# Patient Record
Sex: MALE | Race: WHITE | URBAN - METROPOLITAN AREA
[De-identification: names, ages, dates, MRNs, and addresses within clinical notes are randomized per-mention and may not be internally consistent; named-entity substitution may affect disease eponyms.]

---

## 2020-04-15 ENCOUNTER — DOCUMENTATION ONLY (OUTPATIENT)
Dept: ADDICTION MEDICINE | Facility: CLINIC | Age: 25
End: 2020-04-15

## 2020-04-15 NOTE — PROGRESS NOTES
Patient is scheduled for a new Recovery Clinic appt today with Andreina Krishnamurthy.    Patient does not appear to have health insurance.     [employer] Counselor contact number: [phone]    [email or web address] to apply for insurance online.    Community Suboxone resources for patient's without insurance:    [facility] ([facility])  [address] [place] [address]  [phone] main desk  [phone] Suboxone appt scheduling  Offers sliding fee scale and 340B pharmacy options for patients without insurance    [facility]  [facility] [place] [facility]  [address] [place] [address]  [facility]  Wednesdays 3-5pm    [facility] ([facility])  [address] [place] [address]  [phone]  Offers sliding fee scale for patients without insurance    Cass Adam RN on 4/15/2020 at 9:34 AM

## 2020-04-24 ENCOUNTER — TELEPHONE (OUTPATIENT)
Dept: ADDICTION MEDICINE | Facility: CLINIC | Age: 25
End: 2020-04-24

## 2020-04-28 ENCOUNTER — HOSPITAL ENCOUNTER (EMERGENCY)
Facility: CLINIC | Age: 25
Discharge: HOME | End: 2020-04-28
Attending: EMERGENCY MEDICINE | Admitting: EMERGENCY MEDICINE
Payer: MEDICAID

## 2020-04-28 ENCOUNTER — TELEPHONE (OUTPATIENT)
Dept: ADDICTION MEDICINE | Facility: CLINIC | Age: 25
End: 2020-04-28

## 2020-04-28 VITALS
OXYGEN SATURATION: 98 % | RESPIRATION RATE: 18 BRPM | TEMPERATURE: 97.7 F | HEART RATE: 69 BPM | SYSTOLIC BLOOD PRESSURE: 139 MMHG | DIASTOLIC BLOOD PRESSURE: 87 MMHG

## 2020-04-28 DIAGNOSIS — F11.10 OPIOID ABUSE (H): ICD-10-CM

## 2020-04-28 LAB
AMPHETAMINES UR QL SCN: NEGATIVE
BARBITURATES UR QL: NEGATIVE
BENZODIAZ UR QL: NEGATIVE
CANNABINOIDS UR QL SCN: POSITIVE
COCAINE UR QL: NEGATIVE
ETHANOL UR QL SCN: NEGATIVE
OPIATES UR QL SCN: NEGATIVE

## 2020-04-28 PROCEDURE — 25000132 ZZH RX MED GY IP 250 OP 250 PS 637: Performed by: EMERGENCY MEDICINE

## 2020-04-28 PROCEDURE — 99283 EMERGENCY DEPT VISIT LOW MDM: CPT | Mod: Z6 | Performed by: EMERGENCY MEDICINE

## 2020-04-28 PROCEDURE — 80307 DRUG TEST PRSMV CHEM ANLYZR: CPT | Performed by: EMERGENCY MEDICINE

## 2020-04-28 PROCEDURE — 99283 EMERGENCY DEPT VISIT LOW MDM: CPT | Performed by: EMERGENCY MEDICINE

## 2020-04-28 PROCEDURE — 80320 DRUG SCREEN QUANTALCOHOLS: CPT | Performed by: EMERGENCY MEDICINE

## 2020-04-28 RX ORDER — BUPRENORPHINE AND NALOXONE 4; 1 MG/1; MG/1
1 FILM, SOLUBLE BUCCAL; SUBLINGUAL ONCE
Status: COMPLETED | OUTPATIENT
Start: 2020-04-28 | End: 2020-04-28

## 2020-04-28 RX ADMIN — BUPRENORPHINE AND NALOXONE 1 FILM: 4; 1 FILM BUCCAL; SUBLINGUAL at 16:57

## 2020-04-28 ASSESSMENT — ENCOUNTER SYMPTOMS
FEVER: 0
COUGH: 0
ABDOMINAL PAIN: 0
SHORTNESS OF BREATH: 0

## 2020-04-28 NOTE — DISCHARGE INSTRUCTIONS
Follow-up with your recovery clinic as scheduled.  Return to the ER for new or worsening symptoms.

## 2020-04-28 NOTE — TELEPHONE ENCOUNTER
Have exchanged a number of calls with Dami today. He and his girlfriend are both in withdrawal and have not used for two days. They are both still interested in getting on suboxone. I directed Dami to apply for insurance today and for them both to come to the ED today. Once they are here we will get them appts in the recovery clinic.

## 2020-04-28 NOTE — ED AVS SNAPSHOT
[facility], [place], Emergency Department  [address]  [facility] [address]  Phone:  [phone]  Fax:  [phone]                                    Dami Weber   MRN: [id number]    Department:  [facility], Emergency Department   Date of Visit:  4/28/2020           After Visit Summary Signature Page    I have received my discharge instructions, and my questions have been answered. I have discussed any challenges I see with this plan with the nurse or doctor.    ..........................................................................................................................................  Patient/Patient Representative Signature      ..........................................................................................................................................  Patient Representative Print Name and Relationship to Patient    ..................................................               ................................................  Date                                   Time    ..........................................................................................................................................  Reviewed by Signature/Title    ...................................................              ..............................................  Date                                               Time          22EPIC Rev 08/18

## 2020-04-29 ENCOUNTER — DOCUMENTATION ONLY (OUTPATIENT)
Dept: ADDICTION MEDICINE | Facility: CLINIC | Age: 25
End: 2020-04-29

## 2020-04-29 ENCOUNTER — TELEPHONE (OUTPATIENT)
Dept: ADDICTION MEDICINE | Facility: CLINIC | Age: 25
End: 2020-04-29

## 2020-04-29 ENCOUNTER — VIRTUAL VISIT (OUTPATIENT)
Dept: ADDICTION MEDICINE | Facility: CLINIC | Age: 25
End: 2020-04-29
Payer: MEDICAID

## 2020-04-29 DIAGNOSIS — F17.200 TOBACCO USE DISORDER: ICD-10-CM

## 2020-04-29 DIAGNOSIS — F15.21 SEVERE METHAMPHETAMINE USE DISORDER IN SUSTAINED REMISSION (H): ICD-10-CM

## 2020-04-29 DIAGNOSIS — F43.10 PTSD (POST-TRAUMATIC STRESS DISORDER): ICD-10-CM

## 2020-04-29 DIAGNOSIS — F12.20 CANNABIS USE DISORDER, MODERATE, DEPENDENCE (H): ICD-10-CM

## 2020-04-29 DIAGNOSIS — F11.20 OPIOID USE DISORDER, SEVERE, DEPENDENCE (H): Primary | ICD-10-CM

## 2020-04-29 PROBLEM — F15.90 STIMULANT USE DISORDER: Status: ACTIVE | Noted: 2020-04-29

## 2020-04-29 PROCEDURE — 99443 ZZC PHYSICIAN TELEPHONE EVALUATION 21-30 MIN: CPT

## 2020-04-29 RX ORDER — BUPRENORPHINE AND NALOXONE 8; 2 MG/1; MG/1
FILM, SOLUBLE BUCCAL; SUBLINGUAL
Qty: 11 FILM | Refills: 0 | Status: SHIPPED | OUTPATIENT
Start: 2020-04-29 | End: 2020-05-05

## 2020-04-29 NOTE — TELEPHONE ENCOUNTER
Talked with Dami about treatment options. He wants to get into LP with his girlfriend which I informed him would not be a possibility.     He was able to get his insurance active and made his Recovery Clinic Appointment today.    I forwarded his and his girlfriends chart to Jeanne Rodriguez so that she could reach out to them and do rule 25's. These are scheduled for Friday.

## 2020-04-29 NOTE — PROGRESS NOTES
4/28/2020- I have been reaching out to Dami regularly and was able to make contact with him and his girlfriend yesterday at which time he informed they were both in withdrawal and not doing to well. He stated that it had been 2 days since they both had last used.    I instructed him to come to the ED and get seen here so that we would be able to set them up with an appointment at the Recovery Clinic.     They both came yet left half way through the visit because his girlfriend was having anxiety and suffers from PTSD. I was able to talk to them while they were outside and encourage them to come back in and be seen. They both did return and were dosed with Suboxone and were setup with appointment for 4/29/20 in the recovery clinic.
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
## 2020-04-30 ENCOUNTER — TELEPHONE (OUTPATIENT)
Dept: ADDICTION MEDICINE | Facility: CLINIC | Age: 25
End: 2020-04-30

## 2020-04-30 NOTE — TELEPHONE ENCOUNTER
Prior Authorization Retail Medication Request    Medication/Dose: buprenorphine HCl-naloxone HCl (SUBOXONE) 8-2 MG per film  ICD code (if different than what is on RX):    Previously Tried and Failed:    Rationale:      Insurance Name:  Covermeds  Insurance ID:  [id number]      Pharmacy Information (if different than what is on RX)  Name:    Phone:

## 2020-04-30 NOTE — TELEPHONE ENCOUNTER
Central Prior Authorization Team   Phone: [phone]    PA Initiation    Medication: buprenorphine HCl-naloxone HCl (SUBOXONE) 8-2 MG per film  Insurance Company: [place] Medicaid ([facility]) - Phone [phone] Fax [phone]  Pharmacy Filling the Rx: [id number] [place], [place] - [address] AT [facility] [address]  Filling Pharmacy Phone: [phone]  Filling Pharmacy Fax: [phone]  Start Date: 4/30/2020

## 2020-04-30 NOTE — TELEPHONE ENCOUNTER
PA not needed.  Insurance prefers brand.  Pharmacy processed through insurance and patient picked up.  Pharmacist states he also picked up the narcan.

## 2020-05-01 ENCOUNTER — VIRTUAL VISIT (OUTPATIENT)
Dept: ADDICTION MEDICINE | Facility: CLINIC | Age: 25
End: 2020-05-01
Payer: MEDICAID

## 2020-05-01 DIAGNOSIS — Z53.9 NO SHOW: Primary | ICD-10-CM

## 2020-05-04 ENCOUNTER — DOCUMENTATION ONLY (OUTPATIENT)
Dept: ADDICTION MEDICINE | Facility: CLINIC | Age: 25
End: 2020-05-04

## 2020-05-05 ENCOUNTER — TELEPHONE (OUTPATIENT)
Dept: ADDICTION MEDICINE | Facility: CLINIC | Age: 25
End: 2020-05-05

## 2020-05-05 DIAGNOSIS — F11.20 OPIOID USE DISORDER, SEVERE, DEPENDENCE (H): ICD-10-CM

## 2020-05-05 RX ORDER — BUPRENORPHINE AND NALOXONE 8; 2 MG/1; MG/1
FILM, SOLUBLE BUCCAL; SUBLINGUAL
Qty: 3 FILM | Refills: 0 | Status: SHIPPED | OUTPATIENT
Start: 2020-05-05 | End: 2020-05-06

## 2020-05-05 NOTE — TELEPHONE ENCOUNTER
Prior Authorization Retail Medication Request    Medication/Dose: buprenorphine HCl-naloxone HCl (SUBOXONE) 8-2 MG per film  ICD code (if different than what is on RX):    Previously Tried and Failed:    Rationale:      Insurance Name:  Covermeds  Insurance ID:  [id number]      Pharmacy Information (if different than what is on RX)  Name:    Phone:

## 2020-05-06 ENCOUNTER — VIRTUAL VISIT (OUTPATIENT)
Dept: ADDICTION MEDICINE | Facility: CLINIC | Age: 25
End: 2020-05-06
Payer: MEDICAID

## 2020-05-06 ENCOUNTER — TELEPHONE (OUTPATIENT)
Dept: ADDICTION MEDICINE | Facility: CLINIC | Age: 25
End: 2020-05-06

## 2020-05-06 DIAGNOSIS — F15.90 STIMULANT USE DISORDER: Primary | ICD-10-CM

## 2020-05-06 DIAGNOSIS — F11.20 OPIOID USE DISORDER, SEVERE, DEPENDENCE (H): ICD-10-CM

## 2020-05-06 DIAGNOSIS — F12.20 CANNABIS USE DISORDER, MODERATE, DEPENDENCE (H): ICD-10-CM

## 2020-05-06 DIAGNOSIS — F43.10 PTSD (POST-TRAUMATIC STRESS DISORDER): ICD-10-CM

## 2020-05-06 DIAGNOSIS — F17.200 TOBACCO USE DISORDER: ICD-10-CM

## 2020-05-06 PROCEDURE — 99443 ZZC PHYSICIAN TELEPHONE EVALUATION 21-30 MIN: CPT

## 2020-05-06 RX ORDER — BUPRENORPHINE AND NALOXONE 8; 2 MG/1; MG/1
FILM, SOLUBLE BUCCAL; SUBLINGUAL
Qty: 19 FILM | Refills: 0 | Status: SHIPPED | OUTPATIENT
Start: 2020-05-06 | End: 2020-05-13

## 2020-05-06 NOTE — TELEPHONE ENCOUNTER
Reason for Call:  Other call back and prescription    Detailed comments: Pharmacy called and would like to have the prescribing DrZainab Or the nurse to call them back regarding the current dosage for this medication, buprenorphine HCl-naloxone HCl (SUBOXONE) 8-2 MG per film. The patient's insurance won't cover that amount and they need an alternative dosage so the insurance will pay for it. Patient will be out of this medication today and needs the clinic to call back the pharmacy as soon as possible.     Phone Number Patient can be reached at: Other phone number:  [phone]    Best Time: Business hours     Can we leave a detailed message on this number? Not Applicable    Call taken on 5/6/2020 at 2:47 PM by Marly Villa

## 2020-05-06 NOTE — PROGRESS NOTES
SUBJECTIVE:                                                    BUPRENORPHINE FOLLOW UP:  TELEPHONE VISIT  Dami Weber is a 25 year old pt. who is being evaluated via a billable telephone visit.      The patient has been notified of the following:    We have found that certain health care needs can be provided without the need for a physical exam. This service lets us provide the care you need with a short phone conversation. If a prescription is necessary we can send it directly to your pharmacy. If lab work is needed we can place an order for that and you can then stop by our lab to have the test done at a later time. Insurers are generally covering virtual visits as they would in-office visits so billing should not be different than normal.  If for some reason you do get billed incorrectly, you should contact the billing office to correct it and that number is in the AVS .    Patient has given verbal consent for a telephone visit?:  Yes     Start Time:  1:00 PM          End Time:  1:25 PM      Dami Weber is a 25 year old male who presents to clinic today for follow up of Buprenorphine.      Date of last visit:  4/29/2020  Assessment/Plan at that visit:  Tolerated first dose of suboxone 4 mg well with no signs of precipitated withdrawal (yesterday in ED).  Still having withdrawal symptoms and cravings but lessened.  Will increase dosage and follow up in one week.  He also has significant PTSD symptoms and likely MDD potentially exacerbated by substance use, he did not wish to start any pharmacotherapy at this time and no acute safety concerns, no suicidal thoughts or history of suicidal attempts.  Has not had stimulant use since 2015 aside from one-time use in early April, denies cravings for stimulants.  - Take 8 mg Suboxone this afternoon, then increase to 8 mg in AM and 4 mg in PM tomorrow until follow up   - Follow up next Wed @ 1 PM with Dr. Farris  - Naloxone nasal spray kit ordered  - He is  contemplative for tobacco cessation, will discuss further at next visit  - Would benefit from prazosin for PTSD and trauma based therapy in future but not ready at this time  - Would potentially benefit from antidepressant and therapy but wants to stabilize on suboxone first  - Had HIV/Hep C negative 1 year ago, will repeat blood work at next in-person visit (deferred at this time due to covid-19)      HPI:    5/6/2020  Got 3 strips of the 8 mg films (called clinic and refill was called in, order only encounter from Dr. Rizvi on 5/5/20), had run out as ended up taking 2.25 daily instead of planned 1.5 strips daily.  Was having trouble sleeping, still having loose stools and nausea, body aches, diaphoresis.  When he took the 2.25 strips he felt helped but still having body aches and insomnia and diaphoresis, he took this dosage for 3 days in a row.  Today he took 1x 8 mg film, has none left now.  Having significant cravings.  No return to use for any substance he states.    Is at [facility] treatment program, residential, planned for 3 months, he has urine screens performed there.   He will fill out NIRMALA and fax to our clinic so we can see results and communicate with the program going forwards.    Brief History (incl. Social history)  Social History     Social History Narrative     Not on file   Dami Weber is a 25 year old male who presents for  initial visit for addiction consultation and management referred by [place]  Seen 4/28/20 in withdrawal from opioids with COWS of 11, given 4 mg suboxone  Last use reported was 4/26/20 at that time.    REVIEW OF SYSTEMS:  Complete, 10 point ROS completed. Negative except:  As listed above for withdrawal symptoms    Problem list reviewed & adjusted, as indicated.  Patient Active Problem List    Diagnosis Date Noted     Opioid use disorder, severe, dependence (H) 04/29/2020     Priority: Medium     Stimulant use disorder 04/29/2020     Priority: Medium     Tobacco use  disorder 04/29/2020     Priority: Medium     Cannabis use disorder, moderate, dependence (H) 04/29/2020     Priority: Medium     PTSD (post-traumatic stress disorder) 04/29/2020     Priority: Medium       PMH, FMH reviewed and updated in Epic.    MEDICATION LIST (prior to visit)  buprenorphine HCl-naloxone HCl (SUBOXONE) 8-2 MG per film, Take 8 mg in AM and another 4 mg in PM until follow up  naloxone (NARCAN) 4 MG/0.1ML nasal spray, Spray 1 spray (4 mg) into one nostril alternating nostrils as needed for opioid reversal every 2-3 minutes until assistance arrives    No current facility-administered medications on file prior to visit.       Allergies   Allergen Reactions     Amoxicillin      Penicillins      OBJECTIVE:    PHYSICAL EXAM:  There were no vitals taken for this visit.    LIMITED BY PHONE/TELEMEDICINE ENCOUNTER 2/2 COVID-19    Orientation: full, x3  Alertness: alert  and oriented  Behavior/Demeanor: cooperative  Speech: normal and regular rate and rhythm  Language: intact  Mood: euthymic  Affect: Appeared full range and appropriate; was congruent to mood; was congruent to content  Thought Process/Associations: unremarkable  Thought Content:  Denies suicidal and violent ideation and delusions  Perception:  Denies auditory hallucinations and visual hallucinations  Insight: adequate  Judgment: fair  Cognition: does  appear grossly intact; formal cognitive testing was not done      No results found for any visits on 05/06/20.        ASSESSMENT/PLAN:  Dami was seen today for addiction problem and telephone.    Diagnoses and all orders for this visit:    Stimulant use disorder    Opioid use disorder, severe, dependence (H)    Tobacco use disorder    Cannabis use disorder, moderate, dependence (H)    PTSD (post-traumatic stress disorder)    Follow up for buprenorphine-naloxone maintenance for opioid use disorder.  The 12 mg daily dosage was not enough to control cravings or withdrawal symptoms and he increased  dosage on his own up to 18 mg daily for 3 days, symptoms improved but still present.  He had no return to use, and called clinic for interim prescription until follow up today.  Given continued withdrawal symptoms and cravings on 18 mg, will increase to 20 mg daily for next week to see response.  He is in controlled environment, at residential program at [facility]. He will fill out NIRMALA and fax to our clinic so we can see urine drug screens and communicate as needed.  - Suboxone 12 mg in AM, 8 mg in PM, 19 strips for 1 week supply given (including extra 1.5 films for today)  - Follow up in one week with Dr. Farris @ 1 PM  - He is contemplative for tobacco cessation, will discuss further at future visit  - Would benefit from prazosin for PTSD and trauma based therapy in future but not ready at this time  - Would potentially benefit from antidepressant and therapy but wants to stabilize on suboxone first  - Had HIV/Hep C negative 1 year ago, will repeat blood work at next in-person visit (deferred at this time due to covid-19)    ENCOUNTER FOR LONG TERM USE OF HIGH RISK MEDICATION   High Risk Drug Monitoring?  YES   Drug being monitored: Buprenorphine   Reason for drug: Opioid Use Disorder   What is being monitored?: Dosage, Cravings, Trigger, side effects, and continued abstinence.   Patient has narcan supply at treatment facility (ordered first visit)    [place] Board of Pharmacy Data Base Reviewed:    Yes ;    Consistent with patient reports and Epic records aside from no prescription from yesterday (may not have gone through system yet)    Counseled the patient on the importance of having a recovery program in addition to medication to manage recovery.  Components include avoiding isolating, having willingness to change, avoiding triggers and managing cravings. Encouraged having some type of sober network and practicing honesty with trusted support person(s). Encouraged other services such as counseling, 12 step  or other self-help organizations.      Opioid warning reviewed.  Risk of overdose following a period of abstinence due to decrease tolerance was discussed including risk of death.  Strongly recommended abstain from alcohol, benzodiazepines, THC, opioids and other drugs of abuse.  Increased risk of return to opioid use after use of these substances discussed.  Increased risk of overdose/death with use of other substances particularly benzodiazepines/alcohol reviewed.        RTC 1 week with Dr. Brenton GONZALEZ Addiction Medicine Fellow  [facility]  [phone]      Jameson Rodriguez MD  This note was comprehensively reviewed and edited by myself prior to signing. I confirmed history, assessment and plan directly with fellow and patient.

## 2020-05-06 NOTE — TELEPHONE ENCOUNTER
Called pharmacy. He was prescribed a bridge Rx yesterday, 3x 8-2mg films, and used 2.5 films. MA is not amenable to filling further 8-2mg strength films per pharmacist - they called to clarify with insurance.    Discussion today with Dr. Farris included possibility of 18mg daily dosing - will change his Rx to 12-3mg films, take 1.5 films daily for the next week, total daily dosing of 18mg (#11 films). Will increase in a week if this is insufficient to manage symptoms.    Jameson Rodriguez MD

## 2020-05-07 ENCOUNTER — TELEPHONE (OUTPATIENT)
Dept: ADDICTION MEDICINE | Facility: CLINIC | Age: 25
End: 2020-05-07

## 2020-05-07 NOTE — TELEPHONE ENCOUNTER
Central Prior Authorization Team   Phone: [phone]      Prior Authorization Not Needed per Insurance    05/07/2020  Medication: buprenorphine HCl-naloxone HCl (SUBOXONE) 8-2 MG per film - INITIATED  Insurance Company: [place] Medicaid ([facility]) - Phone [phone] Fax [phone]  Expected CoPay:      Pharmacy Filling the Rx: [id number] [address] AT [facility] [address]  Pharmacy Notified: Yes  Patient Notified: No    Insurance prefers BRAND SUBOXONE - pharmacy received a paid claim and pt picked up.

## 2020-05-08 NOTE — PROGRESS NOTES
Dami has gotten into [facility] term residential [facility]. He stated that his girlfriend is wandering the streets and he is worried about her. He has said that he is going to leave to her because she is not in treatment. He was feeling very sick at the time and had run out of subs.  was able to write a bridge for him until his appointment on Wed.

## 2020-05-13 ENCOUNTER — VIRTUAL VISIT (OUTPATIENT)
Dept: ADDICTION MEDICINE | Facility: CLINIC | Age: 25
End: 2020-05-13
Payer: MEDICAID

## 2020-05-13 DIAGNOSIS — F11.20 OPIOID USE DISORDER, SEVERE, ON MAINTENANCE THERAPY (H): Primary | ICD-10-CM

## 2020-05-13 DIAGNOSIS — F43.10 PTSD (POST-TRAUMATIC STRESS DISORDER): ICD-10-CM

## 2020-05-13 DIAGNOSIS — F17.200 TOBACCO USE DISORDER: ICD-10-CM

## 2020-05-13 DIAGNOSIS — F11.20 OPIOID USE DISORDER, SEVERE, DEPENDENCE (H): ICD-10-CM

## 2020-05-13 DIAGNOSIS — F33.1 MAJOR DEPRESSIVE DISORDER, RECURRENT EPISODE, MODERATE (H): ICD-10-CM

## 2020-05-13 PROCEDURE — 99443: CPT

## 2020-05-13 RX ORDER — PRAZOSIN HYDROCHLORIDE 1 MG/1
1-2 CAPSULE ORAL AT BEDTIME
Qty: 10 CAPSULE | Refills: 0 | Status: SHIPPED | OUTPATIENT
Start: 2020-05-13 | End: 2020-05-20

## 2020-05-13 RX ORDER — BUPRENORPHINE AND NALOXONE 12; 3 MG/1; MG/1
FILM, SOLUBLE BUCCAL; SUBLINGUAL
Qty: 11 FILM | Refills: 0 | Status: SHIPPED | OUTPATIENT
Start: 2020-05-13 | End: 2020-05-20

## 2020-05-13 RX ORDER — ESCITALOPRAM OXALATE 5 MG/1
5 TABLET ORAL DAILY
Qty: 7 TABLET | Refills: 0 | Status: SHIPPED | OUTPATIENT
Start: 2020-05-13 | End: 2020-05-20

## 2020-05-13 NOTE — PROGRESS NOTES
SUBJECTIVE:                                                    BUPRENORPHINE FOLLOW UP:  TELEPHONE VISIT  Dami Weber is a 25 year old pt. who is being evaluated via a billable telephone visit.      The patient has been notified of the following:    We have found that certain health care needs can be provided without the need for a physical exam. This service lets us provide the care you need with a short phone conversation. If a prescription is necessary we can send it directly to your pharmacy. If lab work is needed we can place an order for that and you can then stop by our lab to have the test done at a later time. Insurers are generally covering virtual visits as they would in-office visits so billing should not be different than normal.  If for some reason you do get billed incorrectly, you should contact the billing office to correct it and that number is in the AVS .    Patient has given verbal consent for a telephone visit?:  Yes     Start Time:  1:00 PM          End Time:  1:28 PM      Dami Weber is a 25 year old male who presents to clinic today for follow up of Buprenorphine.      Date of last visit:  5/6/2020  Assessment/Plan at that visit:  The 12 mg daily dosage was not enough to control cravings or withdrawal symptoms and he increased dosage on his own up to 18 mg daily for 3 days, symptoms improved but still present.  He had no return to use, and called clinic for interim prescription until follow up today.  He is in controlled environment, at residential program at [facility].   - Suboxone 12 mg in AM, 6 mg in PM (insurance issues, see other notes).  - Follow up in one week with Dr. Farris @ 1 PM  - He is contemplative for tobacco cessation, will discuss further at future visit  - Would benefit from prazosin for PTSD and trauma based therapy in future but not ready at this time  - Would potentially benefit from antidepressant and therapy but wants to stabilize on suboxone first  - Had  HIV/Hep C negative 1 year ago, will repeat blood work at next in-person visit (deferred at this time due to covid-19)    Since last visit:    Is at [facility] treatment program, residential, planned for 3 months, reports increased dosage of suboxone has helped him go to meetings and group and controls cravings and withdrawal.      He has been having significant PTSD symptoms at night he reports, multiple awakenings at night.  He has persistent intrusive thoughts during day as well.  He reports depressed mood most days with anhedonia, fatigue, hopelessness, guilt.  No SI, no intent or plan.    He will fill out NIRMALA and fax to our clinic so we can see results and communicate with the program going forwards.    Brief History (incl. Social history)  Social History     Social History Narrative     Not on file   Dami Weber is a 25 year old male who presented for   addiction consultation and management referred by [place] [facility]  Seen 4/28/20 in withdrawal from opioids with COWS of 11, given 4 mg suboxone  Last use reported was 4/26/20 at that time.  Have since titrated up on suboxone dosage, and he has entered residential treatment at [facility].    REVIEW OF SYSTEMS:  Complete, 10 point ROS completed. Negative except:  As listed above for withdrawal symptoms    Problem list reviewed & adjusted, as indicated.  Patient Active Problem List    Diagnosis Date Noted     Major depressive disorder, recurrent episode, moderate (H) 05/13/2020     Priority: Medium     Opioid use disorder, severe, dependence (H) 04/29/2020     Priority: Medium     Stimulant use disorder 04/29/2020     Priority: Medium     Tobacco use disorder 04/29/2020     Priority: Medium     Cannabis use disorder, moderate, dependence (H) 04/29/2020     Priority: Medium     PTSD (post-traumatic stress disorder) 04/29/2020     Priority: Medium       PMH, FMH reviewed and updated in Epic.    MEDICATION LIST (prior to visit)  naloxone (NARCAN) 4 MG/0.1ML nasal  spray, Spray 1 spray (4 mg) into one nostril alternating nostrils as needed for opioid reversal every 2-3 minutes until assistance arrives    No current facility-administered medications on file prior to visit.       Allergies   Allergen Reactions     Amoxicillin      Penicillins      OBJECTIVE:    PHYSICAL EXAM:  There were no vitals taken for this visit.    LIMITED BY PHONE/TELEMEDICINE ENCOUNTER 2/2 COVID-19    Orientation: full, x3  Alertness: alert  and oriented  Behavior/Demeanor: cooperative  Speech: normal and regular rate and rhythm  Language: intact  Mood: reports as depressed  Affect: Appeared full range and appropriate; was congruent to mood; was congruent to content  Thought Process/Associations: unremarkable  Thought Content:  Denies suicidal and violent ideation and delusions  Perception:  Denies auditory hallucinations and visual hallucinations  Insight: adequate  Judgment: fair  Cognition: does  appear grossly intact; formal cognitive testing was not done      No results found for any visits on 05/13/20.        ASSESSMENT/PLAN:  Diagnoses and all orders for this visit:    Opioid use disorder, severe, on maintenance therapy (H)  -     Buprenorphine HCl-Naloxone HCl (SUBOXONE) 12-3 MG FILM per film; Take 1 film (12 mg) in AM, Take 1/2 film (6 mg) in PM    Tobacco use disorder    PTSD (post-traumatic stress disorder)  -     prazosin (MINIPRESS) 1 MG capsule; Take 1-2 capsules (1-2 mg) by mouth At Bedtime    Major depressive disorder, recurrent episode, moderate (H)  -     escitalopram (LEXAPRO) 5 MG tablet; Take 1 tablet (5 mg) by mouth daily for 7 days    Opioid use disorder, severe, dependence (H)    Follow up for buprenorphine-naloxone maintenance for opioid use disorder.    Has been taking 12 mg suboxone in AM, 6 mg in PM and this dosage has worked to control cravings and withdrawal symptoms, no side effects noted.  He is in controlled environment, at residential program at [facility]. He will fill  out NIRMALA and fax to our clinic so we can see urine drug screens and communicate as needed.  He reports significant intrusive memories at night and during daytime related to past traumatic events.  He is also having depressed mood with symptoms consistent with major depressive disorder (see HPI).  Will start antidepressant, he has been on a few when younger but thinks may have not tolerated well in past but unable to give specifics.  Will trial low dose of lexapro to start and increase dosage as tolerated, as well as start prazosin for night-time ptsd symtpoms.  - Suboxone 12 mg in AM, 8 mg in PM, 19 strips for 1 week supply given (including extra 1.5 films for today)  - Follow up in one week with Dr. Farris @ 1 PM  - Start lexapro 5 mg daily for depression  - Does not want to stop smoking while in program  - Prazosin 1 mg at bedtime, may increase after 3 days to 2 mg at bedtime if still having intrusive memories at night  - Had HIV/Hep C negative 1 year ago, will repeat blood work at next in-person visit (deferred at this time due to covid-19)    ENCOUNTER FOR LONG TERM USE OF HIGH RISK MEDICATION   High Risk Drug Monitoring?  YES   Drug being monitored: Buprenorphine   Reason for drug: Opioid Use Disorder   What is being monitored?: Dosage, Cravings, Trigger, side effects, and continued abstinence.   Patient has narcan supply at treatment facility (ordered first visit)    [place] Board of Pharmacy Data Base Reviewed:    Yes ;    Consistent with patient reports and Epic records     Counseled the patient on the importance of having a recovery program in addition to medication to manage recovery.  Components include avoiding isolating, having willingness to change, avoiding triggers and managing cravings. Encouraged having some type of sober network and practicing honesty with trusted support person(s). Encouraged other services such as counseling, 12 step or other self-help organizations.      Opioid warning  reviewed.  Risk of overdose following a period of abstinence due to decrease tolerance was discussed including risk of death.  Strongly recommended abstain from alcohol, benzodiazepines, THC, opioids and other drugs of abuse.  Increased risk of return to opioid use after use of these substances discussed.  Increased risk of overdose/death with use of other substances particularly benzodiazepines/alcohol reviewed.    RTC 1 week with Dr. Brenton GONZALEZ Addiction Medicine Fellow  [facility]  [phone]      Jameson Rodriguez MD  This note was comprehensively reviewed and edited by myself prior to signing. I confirmed history, assessment and plan directly with fellow and patient.

## 2020-05-15 ENCOUNTER — TELEPHONE (OUTPATIENT)
Dept: ADDICTION MEDICINE | Facility: CLINIC | Age: 25
End: 2020-05-15

## 2020-05-15 NOTE — TELEPHONE ENCOUNTER
Central Prior Authorization Team   Phone: [phone]    PA Initiation    Medication: Buprenorphine HCl-Naloxone HCl (SUBOXONE) 12-3 MG FILM per film  Insurance Company: [place] Medicaid ([facility]) - Phone [phone] Fax [phone]  Pharmacy Filling the Rx: [id number] [place], [place] - [address] AT [facility] OF [address]  Filling Pharmacy Phone: [phone]  Filling Pharmacy Fax: [phone]  Start Date: 5/15/2020

## 2020-05-15 NOTE — TELEPHONE ENCOUNTER
PRIOR AUTHORIZATION DENIED    Medication: Buprenorphine HCl-Naloxone HCl (SUBOXONE) 12-3 MG FILM per film-DENIED    Denial Date: 5/15/2020    Denial Rational: INSURANCE PREFERS BRAND.  NOTIFIED PHARMACY TO PROCESS BRAND.  PATIENT PICKED UP.        Appeal Information:  IF PATIENT IS UNABLE TO TRY/FAIL ALTERNATIVE(S) PLEASE SUPPLY PA TEAM WITH A LETTER OF MEDICAL NECESSITY WITH CLINICAL REASON.

## 2020-05-15 NOTE — TELEPHONE ENCOUNTER
Prior Authorization Retail Medication Request    Medication/Dose: Buprenorphine HCl-Naloxone HCl (SUBOXONE) 12-3 MG FILM per film  ICD code (if different than what is on RX):    Previously Tried and Failed:    Rationale:      Insurance Name:  Covermymeds  Insurance ID:  [id number]      Pharmacy Information (if different than what is on RX)  Name:    Phone:

## 2020-05-18 ENCOUNTER — TELEPHONE (OUTPATIENT)
Dept: ADDICTION MEDICINE | Facility: CLINIC | Age: 25
End: 2020-05-18

## 2020-05-18 NOTE — TELEPHONE ENCOUNTER
"Staff message from Peer  today: \"Dmai states that he has a bump on his arm by an injection site and it keeps getting bigger.\"    Phone call to Dami. He reports he has a \"bump\" on his arm from a missed injection and it is red, swollen, and \"kind of hard.\" Encouraged Dami to have arm evaluated by PCP or Urgent Care. Dami reports he does not have a PCP. Encouraged Dami to go to Urgent Care. Education provided on need for antibiotics if infected.    Dami expressed concern that [facility], \"will not let me leave.\" Informed Dami that if someone has a medical condition that needs evaluation they should be supported in seeking medical care. Dami said he was \"going to check with my counselor\" about getting his arm evaluated.    Cass Adam RN on 5/18/2020 at 2:11 PM         "

## 2020-05-19 ENCOUNTER — RECORDS - HEALTHEAST (OUTPATIENT)
Dept: ADMINISTRATIVE | Facility: OTHER | Age: 25
End: 2020-05-19

## 2020-05-20 ENCOUNTER — VIRTUAL VISIT (OUTPATIENT)
Dept: ADDICTION MEDICINE | Facility: CLINIC | Age: 25
End: 2020-05-20
Payer: MEDICAID

## 2020-05-20 ENCOUNTER — TELEPHONE (OUTPATIENT)
Dept: ADDICTION MEDICINE | Facility: CLINIC | Age: 25
End: 2020-05-20

## 2020-05-20 DIAGNOSIS — F33.1 MAJOR DEPRESSIVE DISORDER, RECURRENT EPISODE, MODERATE (H): ICD-10-CM

## 2020-05-20 DIAGNOSIS — F41.9 ANXIETY: ICD-10-CM

## 2020-05-20 DIAGNOSIS — F43.10 PTSD (POST-TRAUMATIC STRESS DISORDER): ICD-10-CM

## 2020-05-20 DIAGNOSIS — F11.20 OPIOID USE DISORDER, SEVERE, ON MAINTENANCE THERAPY (H): Primary | ICD-10-CM

## 2020-05-20 PROCEDURE — 99443: CPT

## 2020-05-20 RX ORDER — BUPRENORPHINE AND NALOXONE 12; 3 MG/1; MG/1
FILM, SOLUBLE BUCCAL; SUBLINGUAL
Qty: 12 FILM | Refills: 0 | Status: SHIPPED | OUTPATIENT
Start: 2020-05-20 | End: 2020-05-28 | Stop reason: DRUGHIGH

## 2020-05-20 RX ORDER — QUETIAPINE FUMARATE 50 MG/1
50 TABLET, FILM COATED ORAL AT BEDTIME
Qty: 8 TABLET | Refills: 0 | Status: SHIPPED | OUTPATIENT
Start: 2020-05-20 | End: 2020-05-28 | Stop reason: SINTOL

## 2020-05-20 RX ORDER — QUETIAPINE FUMARATE 100 MG/1
100 TABLET, FILM COATED ORAL AT BEDTIME
Qty: 8 TABLET | Refills: 0 | Status: CANCELLED | OUTPATIENT
Start: 2020-05-20

## 2020-05-20 NOTE — TELEPHONE ENCOUNTER
Phone call to patient to inform him of next scheduled appt, 5/28 in person with Dr Rodriguez. No answer; LVM with appointment info.    NIRMALA for [facility] needed for verification of patient's tx at [facility].    Routing to Peer  to follow up with patient tomorrow re: appt and NIRMALA for [facility].    Routing to Dr Rodriguez as FYI.    Cass Adam RN on 5/20/2020 at 5:12 PM

## 2020-05-20 NOTE — PROGRESS NOTES
SUBJECTIVE:                                                    BUPRENORPHINE FOLLOW UP:  TELEPHONE VISIT  Dami Weber is a 25 year old pt. who is being evaluated via a billable telephone visit.      The patient has been notified of the following:    We have found that certain health care needs can be provided without the need for a physical exam. This service lets us provide the care you need with a short phone conversation. If a prescription is necessary we can send it directly to your pharmacy. If lab work is needed we can place an order for that and you can then stop by our lab to have the test done at a later time. Insurers are generally covering virtual visits as they would in-office visits so billing should not be different than normal.  If for some reason you do get billed incorrectly, you should contact the billing office to correct it and that number is in the AVS .    Patient has given verbal consent for a telephone visit?:  Yes     Patient rescheduled appointment to 2 PM    Start Time:  2:00 PM          End Time: 2:28 PM    [place] : Last prescription filled 5/13/20 for 11x 12 mg films of suboxone for 7 days supply    Dami Weber is a 25 year old male who presents to clinic today for follow up of Buprenorphine.      Date of last visit:  5/13/2020  Plant at that time:  - Suboxone 12 mg in AM, 8 mg in PM, 19 strips for 1 week supply given (including extra 1.5 films for today)  - Follow up in one week with Dr. Farris @ 1 PM  - Start lexapro 5 mg daily for depression  - Does not want to stop smoking while in program  - Prazosin 1 mg at bedtime, may increase after 3 days to 2 mg at bedtime if still having intrusive memories at night  - Had HIV/Hep C negative 1 year ago, will repeat blood work at next in-person visit (deferred at this time due to covid-19)    Since last visit:    Is at [facility] treatment program, residential, planned for 3 months, reports increased dosage of suboxone has helped him go  "to meetings and group and controls cravings and withdrawal.    Phone note from 5/18/20 regarding \"bump on arm\" from prior missed injection site that is growing larger and red/swollen, was directed to go to pcp or urgent care for evaluation.  He went to hospital and had incision and drainage, not on antibiotics he states.    Reports his sleep has worsened, feels sweaty throughout the night.  He feels his PTSD has worsened as well.  He has been more agitated, had altercation with co-resident and almost left program he states but was able to calm down and has stayed in .    He brought up that he had been on benzos when younger and that he found these the only effective medication for his ptsd/anxiety/depression (unclear reason), discussed concerns over potential addictive properties of benzos and risks with opioids.    He thinks he had reaction to lexapro in the past and thinks he is having reaction again.  He is vague on symptoms, describes as being more anxious, agitated, and sweaty at night.  He has also had an abscess that was just drained on his arm and this could have been causing his diaphoresis.  He reports having potential reaction to almost every antidepressant but cannot remember what happened aside from \"not being good\".  He reports same for prozac, effexor, and potentially zoloft.  He has not seen a psychiatrist for many years since he was teenager.  He has been on seroquel before, he thinks he found beneficial for sleep, unsure about mood.  He thinks he was on lithium in the past for a month but taken off due to reaction (unspecified)  Never on lamictal, depakote, abilify  He is vague on his mental health history, says he cannot name any medications himself but when you mention most medications he says he tried and they were problematic.    Discussed potential manic/hypomanic symptoms: he does have trouble sleeping and sleeping less but he is tired during the day.  He describes potential racing thoughts " but describes these due to more anxiety.  No elevated mood, no spending sprees or risk-taking behaviors outside of substance use reported.  Discussed if any past clear manic/hypomanic periods, from his report no clear 5+ day period, though does endorse racing thoughts and insomnia potentially.  Never hospitalized for psychiatry.    Brief History (incl. Social history)  Social History     Social History Narrative     Not on file   Dami Weber is a 25 year old male who presented for   addiction consultation and management referred by [facility]  Seen 4/28/20 in withdrawal from opioids with COWS of 11, given 4 mg suboxone  Last use reported was 4/26/20 at that time.  Have since titrated up on suboxone dosage, and he has entered residential treatment at [facility].    CD History:    DRUG OF CHOICE - opioids       LAST USE:  4/26/20     First began use of opioids in 2019 when living in [place], started with heroin, no use of illicit pills previously or prescribed opioids previously.  Moved to [place] beginning of April.  He had tried suboxone (obtained from friends) for a day in past but never took regularly     LONGEST PERIOD OF SOBRIETY- since starting suboxone  After stopping methamphetamine in 2015 only used marijuana until started heroin in 2019     PREVIOUS DETOX/TREATMENT PROGRAMS- [facility] in [place], [place] 2015 for methamphetamine     HISTORY OF OVERDOSE-none     PREVIOUS MEDICATION ASSISTED TREATMENT:  suboxone taken from friend once but no regular treatment     Other Substances:     ALCOHOL- reports no regular use, drank 10 times in entire life  MARIJUANA- daily use since 12 yo, last use April 2020 weeks ago  PRESCRIPTION STIMULANTS- adderall occasionally when methamphetamine not available  COCAINE/CRACK- denies  METH/AMPHETAMINES- [id number], no use since treatment aside from 1 time use in early April 2020  OPIATES- as above  BENZODIAZEPINES-denies  KRATOM- tried in [place], did not like effect  and no regular use  HALLUCINOGENS - experimented in high school but no use since  OTHER - none reported     NICOTINE- smoked cigarettes since 13 yo              Desire to quit: pre-contemplative                     Previous attempts to quit: none                   Hx of medication for smoking cessation: none prior    REVIEW OF SYSTEMS:  Complete, 10 point ROS completed. Negative except as listed above.    Problem list reviewed & adjusted, as indicated.  Patient Active Problem List    Diagnosis Date Noted     Major depressive disorder, recurrent episode, moderate (H) 05/13/2020     Priority: Medium     Opioid use disorder, severe, dependence (H) 04/29/2020     Priority: Medium     Stimulant use disorder 04/29/2020     Priority: Medium     Tobacco use disorder 04/29/2020     Priority: Medium     Cannabis use disorder, moderate, dependence (H) 04/29/2020     Priority: Medium     PTSD (post-traumatic stress disorder) 04/29/2020     Priority: Medium       PMH, FMH reviewed and updated in Epic.    MEDICATION LIST (prior to visit)  Buprenorphine HCl-Naloxone HCl (SUBOXONE) 12-3 MG FILM per film, Take 1 film (12 mg) in AM, Take 1/2 film (6 mg) in PM  escitalopram (LEXAPRO) 5 MG tablet, Take 1 tablet (5 mg) by mouth daily for 7 days  naloxone (NARCAN) 4 MG/0.1ML nasal spray, Spray 1 spray (4 mg) into one nostril alternating nostrils as needed for opioid reversal every 2-3 minutes until assistance arrives  prazosin (MINIPRESS) 1 MG capsule, Take 1-2 capsules (1-2 mg) by mouth At Bedtime    No current facility-administered medications on file prior to visit.       Allergies   Allergen Reactions     Amoxicillin      Penicillins      OBJECTIVE:    PHYSICAL EXAM:  There were no vitals taken for this visit.    LIMITED BY PHONE/TELEMEDICINE ENCOUNTER 2/2 COVID-19    Orientation: full, x3  Alertness: alert  and oriented  Behavior/Demeanor: cooperative  Speech: normal and regular rate and rhythm  Language: intact  Mood: reports as  depressed and anxioius  Affect: Anxious; was congruent to mood; was congruent to content  Thought Process/Associations: unremarkable  Thought Content:  Denies suicidal and violent ideation and delusions  Perception:  Denies auditory hallucinations and visual hallucinations  Insight: adequate  Judgment: fair  Cognition: does  appear grossly intact; formal cognitive testing was not done      No results found for any visits on 05/20/20.        ASSESSMENT/PLAN:  Diagnoses and all orders for this visit:    Opioid use disorder, severe, on maintenance therapy (H)  -     Buprenorphine HCl-Naloxone HCl (SUBOXONE) 12-3 MG FILM per film; Take 1 film (12 mg) in AM, Take 1/2 film (6 mg) in PM    PTSD (post-traumatic stress disorder)    Major depressive disorder, recurrent episode, moderate (H)  -     QUEtiapine (SEROQUEL) 50 MG tablet; Take 1 tablet (50 mg) by mouth At Bedtime    Anxiety  -     QUEtiapine (SEROQUEL) 50 MG tablet; Take 1 tablet (50 mg) by mouth At Bedtime    Follow up for buprenorphine-naloxone maintenance for opioid use disorder.    Has been taking 12 mg suboxone in AM, 6 mg in PM and this dosage has worked to control cravings and withdrawal symptoms, no side effects noted.   He is in controlled environment, at residential program at [facility].   There is general consent for services dated 4/28/20 in chart  He did fill out NIRMALA he states, he will make sure they sent, nothing in media tab so either he did not fill it out, or it was not sent or has not been processed.  Tried calling [facility] to confirm he is actually attending their facility, it goes straight to voicemail on multiple tries.    Given potential prior reactions to multiple antidepressants, reports of worsening mood, anxiety, agitation, PTSD symptoms, will stop lexapro at this time in case either early sign of triggering underlying bipolar disorder or other reaction to medication.  It is difficult to clearly diagnose his mental health disorder given  vague history.  See HPI today for bipolar screening, he does not meet criteria based on his report but did say he was on lithium in the past.  Discussed medication options with him, given concerns over antidepressants, insomnia, anxiety, depression concerns, will trial seroquel at night.  Lamictal is also another potential option but would recommend he establish with regular psychiatrist for ongoing care.  Will hold prazosin at this time as starting another night time medication, and unclear reaction to prior medications, may reconsider trial in future.    - Continue Suboxone 12 mg in AM, 6 mg in PM  - Stop lexapro and prazosin  - Start seroquel 50 mg at bedtime for anxiety, depression, insomnia, see above for medication discussion  - Does not want to stop smoking while in program  - Had HIV/Hep C negative 1 year ago, will repeat blood work at next in-person visit (deferred at this time due to covid-19)    ENCOUNTER FOR LONG TERM USE OF HIGH RISK MEDICATION   High Risk Drug Monitoring?  YES   Drug being monitored: Buprenorphine   Reason for drug: Opioid Use Disorder   What is being monitored?: Dosage, Cravings, Trigger, side effects, and continued abstinence.   Patient has narcan supply at treatment facility (ordered first visit)    [place] Board of Pharmacy Data Base Reviewed:    Yes ;    Consistent with patient reports and Epic records     Counseled the patient on the importance of having a recovery program in addition to medication to manage recovery.  Components include avoiding isolating, having willingness to change, avoiding triggers and managing cravings. Encouraged having some type of sober network and practicing honesty with trusted support person(s). Encouraged other services such as counseling, 12 step or other self-help organizations.      Opioid warning reviewed.  Risk of overdose following a period of abstinence due to decrease tolerance was discussed including risk of death.  Strongly recommended  abstain from alcohol, benzodiazepines, THC, opioids and other drugs of abuse.  Increased risk of return to opioid use after use of these substances discussed.  Increased risk of overdose/death with use of other substances particularly benzodiazepines/alcohol reviewed.    RTC 2 PM Thursday May 28th with Dr. Michael GONZALEZ Addiction Medicine Fellow  [facility]  [phone]      Jameson Rodriguez MD  This note was comprehensively reviewed and edited by myself prior to signing. I confirmed history, assessment and plan directly with fellow and patient.

## 2020-05-27 ENCOUNTER — HOSPITAL ENCOUNTER (OUTPATIENT)
Dept: BEHAVIORAL HEALTH | Facility: CLINIC | Age: 25
Discharge: HOME | End: 2020-05-27
Attending: FAMILY MEDICINE | Admitting: FAMILY MEDICINE
Payer: MEDICAID

## 2020-05-27 PROCEDURE — H0001 ALCOHOL AND/OR DRUG ASSESS: HCPCS | Mod: HF,GT

## 2020-05-27 NOTE — PROGRESS NOTES
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 me about that.     Unemployed at this time    2A. Describe a typical day; evening for you. Work, school, social, leisure, volunteer, spiritual practices. Include time spent obtaining, using, recovering from drugs or alcohol. (DSM)     Lately I wake up workout, have breakfast, relax listen to music, workout again, hang out with my family and work out again.    Please describe what leisure activities have been associated with your substance abuse:     The patient denied having any leisure activities which had been associated with his substance abuse.    2B. How often do you spend more time than you planned using or use more than you planned? (DSM)     I would use all day long    3. How important is using to your social connections? Do many of your family or friends use?     They don't use    4A. Are you currently in a significant relationship?     Yes.  4B. How long? 3.5 years            Please describe your significant other's use of mood altering chemicals? She doesn't use    4C. Sexual Orientation:     Heterosexual    5A. Who do you live with?      grandparents    5B. Tell me about their alcohol/drug use and mental health issues.     They use alcohol here and there  socially    5C. Are you concerned for your safety there? No    5D. Are you concerned about the safety of anyone else who lives with you? No    6A. Do you have children who live with you?     No    6B. Do you have children who do not live with you?     Yes.  (Ask follow-up questions to determine the person's relationship and responsibility, both legal and care giving; and what arrangements are made for supervision for the children when the person is not available.) 5 year old son and a 3 year old daughter with their mother, not my fiance.    7A. Who supports you in making changes in your alcohol or drug use? What are they willing to do to support you? Who is upset or angry about you making changes in your alcohol or drug use? How big a problem is this for you?      My whole family supports me    7B. This table is provided to record information about the person s relationships and available support It is not necessary to ask each item; only to get a comprehensive picture of their support system.  How often can you count on the following people when you need someone?   Partner / Spouse Always supportive   Parent(s)/Aunt(s)/Uncle(s)/Grandparents The patient's parents and other family members are .   Sibling(s)/Cousin(s) Always supportive   Child(lucinda) Always supportive   Other relative(s) The patient doesn't have any current contact with other relatives.   Friend(s)/neighbor(s) Never supportive   Child(lucinda) s father(s)/mother(s) Never supportive   Support group member(s) The patient denied having any current involvement with 12-step or other support group meetings.   Community of chano members Always supportive   /counselor/therapist/healer The patient denied having any current involvement with a , counselor, therapist or healer.   Other (specify) No     8A. What is your current living situation?     Live in a house with my grandparents     8B. What is your long term plan for where you  will be living?     Go to treatment, get help, see a therapist, when I get my settlement I will buy a house and a car.    8C. Tell me about your living environment/neighborhood? Ask enough follow up questions to determine safety, criminal activity, availability of alcohol and drugs, supportive or antagonistic to the person making changes.      Current living environment is healthy and safe    9. Criminal justice history: Gather current/recent history and any significant history related to substance use--Arrests? Convictions? Circumstances? Alcohol or drug involvement? Sentences? Still on probation or parole? Expectations of the court? Current court order? Any sex offenses - lifetime? What level? (DSM)    None    10. What obstacles exist to participating in treatment? (Time off work, childcare, funding, transportation, pending retirement time, living situation)     The patient denied having any obstacles for participating in substance abuse treatment.    Dimension VI Ratings   Recovery environment - The placing authority must use the criteria in Dimension VI to determine a client s recovery environment.   RISK DESCRIPTIONS - Severity ratin Client is engaged in structured, meaningful activity, but peers, family, significant other, and living environment are unsupportive, or there is criminal justice involvement by the client or among the client's peers, significant others, or in the client's living environment.    REASONS SEVERITY WAS ASSIGNED - (What support does the person have for making changes? What structure/stability does the person have in his or her daily life that will increase the likelihood that changes can be sustained? What problems exist in the person s environment that will jeopardize getting/staying clean and sober?)     Patient currently resides with his grandparents. His current living situation is supportive toward recovery. He is in a significant relationship and is currently engaged. He reported  that most of his use of heroin has been done alone. Patient lacks a current sober support network. He denied having any concerns regarding immediate living environment or neighborhood. Patient is unemployed, and lacks a daily structure and meaningful activities. Patient denied legal involvement.            Client Choice/Exceptions   Would you like services specific to language, age, gender, culture, Muslim preference, race, ethnicity, sexual orientation or disability?  No    What particular treatment choices and options would you like to have? residential    Do you have a preference for a particular treatment program? [facility]    Criteria for Diagnosis     Criteria for Diagnosis  DSM-5 Criteria for Substance Use Disorder  Instructions: Determine whether the client currently meets the criteria for Substance Use Disorder using the diagnostic criteria in the DSM-V [id number].[phone]. Current means during the most recent 12 months outside a facility that controls access to substances    Category of Substance Severity (ICD-10 Code / DSM 5 Code)     Alcohol Use Disorder The patient does not meet the criteria for an Alcohol use disorder.   Cannabis Use Disorder Severe   (F12.20) (304.30)   Hallucinogen Use Disorder The patient does not meet the criteria for a Hallucinogen use disorder.   Inhalant Use Disorder The patient does not meet the criteria for an Inhalant use disorder.   Opioid Use Disorder Severe   (F11.20) (304.00)   Sedative, Hypnotic, or Anxiolytic Use Disorder The patient does not meet the criteria for a Sedative/Hypnotic use disorder.   Stimulant Related Disorder The patient does not meet the criteria for a Stimulant use disorder.   Tobacco Use Disorder Moderate   (F17.200) (305.1)   Other (or unknown) Substance Use Disorder The patient does not meet the criteria for a Other (or unknown) Substance use disorder.       Collateral Contact Summary   Number of contacts made: 0    Contact with referring person:   No    If court related records were reviewed, summarize here: No court records had been reviewed at the time of this documentation.      Rule 25 Assessment Summary and Plan   's Recommendation     It is recommended that patient:  1). Participate in and complete the 28-day lodging/residential substance use treatment program at [facility], or a similar program.   2). Follow all subsequent recommendations of the substance use treatment providers.   3). Abstain from alcohol and all mood-altering substances, except as prescribed. Take all medications as prescribed.   4). Attend AA at least twice weekly and obtain a male sponsor for additional sober supports.   5). Become involved in a daily sober recreational activity/hobby of his own interest.  6). Have a mental health evaluation to determine accurate diagnoses and which psychotropic medications would be effective.   7). Begin weekly individual mental health therapy with a trauma-informed therapist.  8). Discuss with a doctor/psychiatrist use of Suboxone or Naltrexone for tapering off opioid use and assisting with sobriety.         Collateral Contacts     Name:    [facility]   Relationship:    [facility]   Phone Number:     Releases:    No     Medical records were reviewed and support the above assessment.     ollateral Contacts      A problematic pattern of alcohol/drug use leading to clinically significant impairment or distress, as manifested by at least two of the following, occurring within a 12-month period:    1.) Alcohol/drug is often taken in larger amounts or over a longer period than was intended.  2.) There is a persistent desire or unsuccessful efforts to cut down or control alcohol/drug use  3.) A great deal of time is spent in activities necessary to obtain alcohol, use alcohol, or recover from its effects.  4.) Craving, or a strong desire or urge to use alcohol/drug  5.) Recurrent alcohol/drug use resulting in a failure to fulfill major  role obligations at work, school or home.  6.) Continued alcohol use despite having persistent or recurrent social or interpersonal problems caused or exacerbated by the effects of alcohol/drug.  7.) Important social, occupational, or recreational activities are given up or reduced because of alcohol/drug use.  10.) Tolerance, as defined by either of the following: A need for markedly increased amounts of alcohol/drug to achieve intoxication or desired effect.  11.) Withdrawal, as manifested by either of the following: Alcohol/drug (or a closely related substance, such as a benzodiazepine) is taken to relieve or avoid withdrawal symptoms.      Specify if: In early remission:  After full criteria for alcohol/drug use disorder were previously met, none of the criteria for alcohol/drug use disorder have been met for at least 3 months but for less than 12 months (with the exception that Criterion A4,  Craving or a strong desire or urge to use alcohol/drug  may be met).     In sustained remission:   After full criteria for alcohol use disorder were previously met, non of the criteria for alcohol/drug use disorder have been met at any time during a period of 12 months or longer (with the exception that Criterion A4,  Craving or strong desire or urge to use alcohol/drug  may be met).   Specify if:   This additional specifier is used if the individual is in an environment where access to alcohol is restricted.    Mild: Presence of 2-3 symptoms  Moderate: Presence of 4-5 symptoms  Severe: Presence of 6 or more symptoms
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
## 2020-05-28 ENCOUNTER — VIRTUAL VISIT (OUTPATIENT)
Dept: ADDICTION MEDICINE | Facility: CLINIC | Age: 25
End: 2020-05-28
Payer: MEDICAID

## 2020-05-28 ENCOUNTER — APPOINTMENT (OUTPATIENT)
Dept: ADDICTION MEDICINE | Facility: CLINIC | Age: 25
End: 2020-05-28
Payer: MEDICAID

## 2020-05-28 DIAGNOSIS — F41.9 ANXIETY: ICD-10-CM

## 2020-05-28 DIAGNOSIS — F33.1 MAJOR DEPRESSIVE DISORDER, RECURRENT EPISODE, MODERATE (H): ICD-10-CM

## 2020-05-28 DIAGNOSIS — F43.10 PTSD (POST-TRAUMATIC STRESS DISORDER): ICD-10-CM

## 2020-05-28 DIAGNOSIS — F11.20 OPIOID USE DISORDER, SEVERE, ON MAINTENANCE THERAPY (H): Primary | ICD-10-CM

## 2020-05-28 PROCEDURE — 99443: CPT | Performed by: FAMILY MEDICINE

## 2020-05-28 RX ORDER — GABAPENTIN 300 MG/1
300 CAPSULE ORAL 3 TIMES DAILY PRN
Qty: 45 CAPSULE | Refills: 1 | Status: SHIPPED | OUTPATIENT
Start: 2020-05-28 | End: 2020-06-10

## 2020-05-28 RX ORDER — BUPRENORPHINE AND NALOXONE 8; 2 MG/1; MG/1
FILM, SOLUBLE BUCCAL; SUBLINGUAL
Qty: 18 FILM | Refills: 0 | Status: SHIPPED | OUTPATIENT
Start: 2020-05-28 | End: 2020-06-01 | Stop reason: ALTCHOICE

## 2020-05-28 NOTE — PROGRESS NOTES
"Dami Weber is a 25 year old male who is being evaluated via a billable telephone visit.      The patient has been notified of following:     \"This telephone visit will be conducted via a call between you and your physician/provider. We have found that certain health care needs can be provided without the need for a physical exam.  This service lets us provide the care you need with a short phone conversation.  If a prescription is necessary we can send it directly to your pharmacy.  If lab work is needed we can place an order for that and you can then stop by our lab to have the test done at a later time.    Telephone visits are billed at different rates depending on your insurance coverage. During this emergency period, for some insurers they may be billed the same as an in-person visit.  Please reach out to your insurance provider with any questions.    If during the course of the call the physician/provider feels a telephone visit is not appropriate, you will not be charged for this service.\"    Patient has given verbal consent for Telephone visit?  Yes    What phone number would you like to be contacted at? [phone]    How would you like to obtain your AVS? Mail a copy        SUBJECTIVE:                                                    BUPRENORPHINE FOLLOW UP:    Dami Weber is a 25 year old male who completes phone visit today for follow up of Buprenorphine management        Date of last visit:  5/27/2020    Primary Care Provider: Physician No Ref-Primary        HPI:    5/28/2020  - SE: none  - Left [facility] last weekend  - Denies any substance use  - Got to his friend's house, and before he had a chance to use he called his grandmother  - Stopped seroquel; after taking it for 2 nights had vivid nightmares - remembers a past history of the same reaction  - Working out a lot, which has been helpful, improves his mood and sleep  - Sleep is complicated by racing thoughts - waking up every " "1-2 hours some nights, but he is able to sleep and feel somewhat rested by the morning  - Mood is \"not right\". Racing thoughts all the time. \"I'm constantly depressed\". Unable to have a full day without relief from depressed feelings  - Continues to take suboxone, 12mg in AM, 6mg in PM. In the evening he notes feeling of withdrawal - restless legs (legs want to run), aches/pains, difficulty sleeping. Feels very similar to when he tries to come off heroin.  - Takes his evening dose around 4-4:30PM, starts to notice symptoms as above around 8pm. These are consistent throughout the night.        Patient Active Problem List    Diagnosis Date Noted     Major depressive disorder, recurrent episode, moderate (H) 05/13/2020     Priority: Medium     Opioid use disorder, severe, dependence (H) 04/29/2020     Priority: Medium     Stimulant use disorder 04/29/2020     Priority: Medium     Tobacco use disorder 04/29/2020     Priority: Medium     Cannabis use disorder, moderate, dependence (H) 04/29/2020     Priority: Medium     PTSD (post-traumatic stress disorder) 04/29/2020     Priority: Medium       Problem list and histories reviewed & adjusted, as indicated.  Additional history: as documented      Buprenorphine HCl-Naloxone HCl (SUBOXONE) 12-3 MG FILM per film, Take 1 film (12 mg) in AM, Take 1/2 film (6 mg) in PM  naloxone (NARCAN) 4 MG/0.1ML nasal spray, Spray 1 spray (4 mg) into one nostril alternating nostrils as needed for opioid reversal every 2-3 minutes until assistance arrives  QUEtiapine (SEROQUEL) 50 MG tablet, Take 1 tablet (50 mg) by mouth At Bedtime (Patient not taking: Reported on 5/28/2020)    No current facility-administered medications on file prior to visit.       Allergies   Allergen Reactions     Amoxicillin      Penicillins          OBJECTIVE    GENERAL: healthy, alert and no distress  RESP: no audible wheeze, cough.  No increased work of breathing.  Able to speak fully in complete sentences.  PSYCH: " mentation appears normal, affect anxious, judgement and insight intact, normal speech        Labs reviewed in EPIC      ASSESSMENT/PLAN:  Dami was seen today for telephone.    Diagnoses and all orders for this visit:    Opioid use disorder, severe, on maintenance therapy (H)  #18 dispensed. Denies any side effects. Appears to be having breakthrough withdrawal symptoms, which is unusual above 16mg daily dosing. Experienced some relief with increase from 16 -> 18mg, now having recurrence. Unclear etiology. Denies any use. However he is no longer in residential programming and may have substance use complicating his presentation. Wishes to enter [facility] next week, has insurance and admission setup. Will increase suboxone dose to 20mg as below to help with ongoing withdrawal concerns, and add gabapentin for anxiety as well as a possible adjunct w/d symptom medication.    -     buprenorphine HCl-naloxone HCl (SUBOXONE) 8-2 MG per film; 1.5 films in AM, 1 film in PM  -     gabapentin (NEURONTIN) 300 MG capsule; Take 1 capsule (300 mg) by mouth 3 times daily as needed (anxiety, restless legs)    PTSD (post-traumatic stress disorder)  Major depressive disorder, recurrent episode, moderate (H)  Anxiety  Will work with gabapentin for PRN anxiety relief for now. Reports good tolerance of this medication in the past. Need to extensively review notes for a better picture of appropriate next steps in therapy for his unspecified mood disorder, which likely includes the above diagnoses.                (Z79.899) High risk medication use   Plan: High Risk Drug Monitoring?  YES   Drug being monitored: Buprenorphine   Reason for drug: Opioid use disorder   What is being monitored?: Dosage, Cravings, Trigger, side effects, and continued abstinence.      Phone call contact time:    22 minutes      [place] Board of Pharmacy Data Base Reviewed:    Yes ;    Consistent with patient reports and Epic records.    Counseled the  patient on the importance of having a recovery program in addition to medication to manage recovery.  Components include avoiding isolating, having willingness to change, avoiding triggers and managing cravings. Encouraged having some type of sober network and practicing honesty with trusted support person(s). Encouraged other services such as counseling, 12 step or other self-help organizations.      Opioid warning reviewed.  Risk of overdose following a period of abstinence due to decrease tolerance was discussed including risk of death.  Strongly recommended abstain from alcohol, benzodiazepines, THC, opioids and other drugs of abuse.  Increased risk of return to opioid use after use of these substances discussed.  Increased risk of overdose/death with use of other substances particularly benzodiazepines/alcohol reviewed.      RTC - 1 week    Jameson Rodriguez MD  [facility] - [phone]

## 2020-05-29 ENCOUNTER — TELEPHONE (OUTPATIENT)
Dept: ADDICTION MEDICINE | Facility: CLINIC | Age: 25
End: 2020-05-29

## 2020-05-29 NOTE — TELEPHONE ENCOUNTER
No pa needed. Insurance preferred brand Suboxone instead of generic. Verified with pharmacy that they already got brand processed through insurance and medication is ready for .

## 2020-05-29 NOTE — TELEPHONE ENCOUNTER
Prior Authorization Retail Medication Request    Medication/Dose: buprenorphine HCl-naloxone HCl (SUBOXONE) 8-2 MG per film   ICD code (if different than what is on RX):    Previously Tried and Failed:    Rationale:      Insurance Name:  [employer]eds  Insurance ID:  [id number]      Pharmacy Information (if different than what is on RX)  Name:    Phone:

## 2020-06-01 RX ORDER — BUPRENORPHINE HYDROCHLORIDE, NALOXONE HYDROCHLORIDE 8; 2 MG/1; MG/1
FILM, SOLUBLE BUCCAL; SUBLINGUAL
Start: 2020-06-01 | End: 2020-06-05

## 2020-06-02 NOTE — ADDENDUM NOTE
Encounter addended by: Jeanne Rodriguez LADC on: 6/2/2020 12:32 PM   Actions taken: Clinical Note Signed

## 2020-06-02 NOTE — PROGRESS NOTES
JITENDRA evaluator sent Rule 25 application to [facility] for funding for [facility].

## 2020-06-03 ENCOUNTER — TELEPHONE (OUTPATIENT)
Dept: ADDICTION MEDICINE | Facility: CLINIC | Age: 25
End: 2020-06-03

## 2020-06-03 NOTE — TELEPHONE ENCOUNTER
Dami reached out and was wondering when he was going to be able to get into [facility]. He stated that Jeanne had told him soon. I gave him the number for [facility] to connect with them about an intake date and I also informed him that they would reach out as soon as they set for him to come in.

## 2020-06-04 NOTE — TELEPHONE ENCOUNTER
Received a voicemail from the patient regarding getting into the [facility] program. Patient left two call back numbers [phone](Mother) and (Patient's Number)[phone]. Tried calling his number and there was no answer/No voicemail set up. Then I called his mothers number and she said she would give him the message. Still waiting on funding approval so pt can be added to the wait list.

## 2020-06-05 ENCOUNTER — TELEPHONE (OUTPATIENT)
Dept: ADDICTION MEDICINE | Facility: CLINIC | Age: 25
End: 2020-06-05

## 2020-06-05 DIAGNOSIS — F11.20 OPIOID USE DISORDER, SEVERE, ON MAINTENANCE THERAPY (H): Primary | ICD-10-CM

## 2020-06-05 RX ORDER — BUPRENORPHINE HYDROCHLORIDE, NALOXONE HYDROCHLORIDE 8; 2 MG/1; MG/1
FILM, SOLUBLE BUCCAL; SUBLINGUAL
Qty: 11 FILM | Refills: 0 | Status: SHIPPED | OUTPATIENT
Start: 2020-06-05 | End: 2020-06-10

## 2020-06-05 NOTE — TELEPHONE ENCOUNTER
"Dami was unable to make his appt this morning. [facility] called to check in this morning, and Dami indicated he could take a phone call.    Feeling \"alright\". Has been very busy this week trying to arrange details for treatment. Typically has a hard time this time of year d/t the anniversary of his mom's passing.    Has done well with gabapentin and buprenorphine. Increased dose of suboxone helped overcome his lingering symptoms.    No opioid use. Drank a small amount of alcohol last night. Denies any regular drinking, and he agrees to not drink any more alcohol before comign to [facility] program    Is waiting for [facility] approval for [facility] funding before he can get admitted.    Jameson Rodriguez MD    "

## 2020-06-10 ENCOUNTER — VIRTUAL VISIT (OUTPATIENT)
Dept: ADDICTION MEDICINE | Facility: CLINIC | Age: 25
End: 2020-06-10
Payer: MEDICAID

## 2020-06-10 DIAGNOSIS — F11.20 OPIOID USE DISORDER, SEVERE, ON MAINTENANCE THERAPY (H): ICD-10-CM

## 2020-06-10 PROCEDURE — 99441 ZZC PHYSICIAN TELEPHONE EVALUATION 5-10 MIN: CPT | Performed by: FAMILY MEDICINE

## 2020-06-10 RX ORDER — GABAPENTIN 300 MG/1
300 CAPSULE ORAL 3 TIMES DAILY PRN
Qty: 90 CAPSULE | Refills: 1 | Status: SHIPPED | OUTPATIENT
Start: 2020-06-10 | End: 2020-06-25

## 2020-06-10 RX ORDER — BUPRENORPHINE HYDROCHLORIDE, NALOXONE HYDROCHLORIDE 8; 2 MG/1; MG/1
FILM, SOLUBLE BUCCAL; SUBLINGUAL
Qty: 35 FILM | Refills: 0 | Status: SHIPPED | OUTPATIENT
Start: 2020-06-10 | End: 2020-06-25

## 2020-06-10 NOTE — PROGRESS NOTES
"Dami Weber is a 25 year old male who is being evaluated via a billable telephone visit.      The patient has been notified of following:     \"This telephone visit will be conducted via a call between you and your physician/provider. We have found that certain health care needs can be provided without the need for a physical exam.  This service lets us provide the care you need with a short phone conversation.  If a prescription is necessary we can send it directly to your pharmacy.  If lab work is needed we can place an order for that and you can then stop by our lab to have the test done at a later time.    Telephone visits are billed at different rates depending on your insurance coverage. During this emergency period, for some insurers they may be billed the same as an in-person visit.  Please reach out to your insurance provider with any questions.    If during the course of the call the physician/provider feels a telephone visit is not appropriate, you will not be charged for this service.\"    Patient has given verbal consent for Telephone visit?  Yes    What phone number would you like to be contacted at? [phone]    .Stacy Sandifer MA  How would you like to obtain your AVS? MyChart        SUBJECTIVE:                                                    BUPRENORPHINE FOLLOW UP:    Dami Weber is a 25 year old male who completes phone visit today for follow up of Buprenorphine management        Date of last visit:  6/5/2020    Primary Care Provider: Physician No Ref-Primary        HPI:    6/15/2020  - SE: none  - Doing well with current dose of suboxone, no concerns  - Anxiety well-controlled currently  - Denies cravings  - [facility] tomorrow  - Staying with grandmother tonight, who is cooking him dinner        Patient Active Problem List    Diagnosis Date Noted     Chemical dependency (H) 06/11/2020     Priority: Medium     Major depressive disorder, recurrent episode, moderate (H) " 05/13/2020     Priority: Medium     Opioid use disorder, severe, dependence (H) 04/29/2020     Priority: Medium     Stimulant use disorder 04/29/2020     Priority: Medium     Tobacco use disorder 04/29/2020     Priority: Medium     Cannabis use disorder, moderate, dependence (H) 04/29/2020     Priority: Medium     PTSD (post-traumatic stress disorder) 04/29/2020     Priority: Medium       Problem list and histories reviewed & adjusted, as indicated.  Additional history: as documented      naloxone (NARCAN) 4 MG/0.1ML nasal spray, Spray 1 spray (4 mg) into one nostril alternating nostrils as needed for opioid reversal every 2-3 minutes until assistance arrives    No current facility-administered medications on file prior to visit.       Allergies   Allergen Reactions     Amoxicillin      Penicillins          OBJECTIVE    GENERAL: healthy, alert and no distress  RESP: no audible wheeze, cough.  No increased work of breathing.  Able to speak fully in complete sentences.  PSYCH: mentation appears normal, affect normal/bright, judgement and insight intact, normal speech        Labs reviewed in EPIC      ASSESSMENT/PLAN:  Dami was seen today for addiction problem.    Diagnoses and all orders for this visit:    Opioid use disorder, severe, on maintenance therapy (H)  -     SUBOXONE 8-2 MG per film; Take 1.5 films in AM, 1 film in PM  -     gabapentin (NEURONTIN) 300 MG capsule; Take 1 capsule (300 mg) by mouth 3 times daily as needed (anxiety, restless legs)    #35 dispensed. Continue current plan of care. Denies any side effects. Follow-up visits every 2 weeks. Starting [facility] residential treatment tomorrow. Will follow with him while he is there.               (Z79.180) High risk medication use   Plan: High Risk Drug Monitoring?  YES   Drug being monitored: Buprenorphine   Reason for drug: Opioid use disorder   What is being monitored?: Dosage, Cravings, Trigger, side effects, and continued abstinence.      Phone  call contact time:    5 minutes      [place] Board of Pharmacy Data Base Reviewed:    Yes ;    Consistent with patient reports and Epic records.    Counseled the patient on the importance of having a recovery program in addition to medication to manage recovery.  Components include avoiding isolating, having willingness to change, avoiding triggers and managing cravings. Encouraged having some type of sober network and practicing honesty with trusted support person(s). Encouraged other services such as counseling, 12 step or other self-help organizations.      Opioid warning reviewed.  Risk of overdose following a period of abstinence due to decrease tolerance was discussed including risk of death.  Strongly recommended abstain from alcohol, benzodiazepines, THC, opioids and other drugs of abuse.  Increased risk of return to opioid use after use of these substances discussed.  Increased risk of overdose/death with use of other substances particularly benzodiazepines/alcohol reviewed.      RTC - 2 weeks    Jameson Rodriguez MD  [facility] - [phone]

## 2020-06-11 ENCOUNTER — HOSPITAL ENCOUNTER (OUTPATIENT)
Dept: BEHAVIORAL HEALTH | Facility: CLINIC | Age: 25
End: 2020-06-11
Attending: FAMILY MEDICINE
Payer: MEDICAID

## 2020-06-11 ENCOUNTER — BEH TREATMENT PLAN (OUTPATIENT)
Dept: BEHAVIORAL HEALTH | Facility: CLINIC | Age: 25
End: 2020-06-11
Attending: FAMILY MEDICINE

## 2020-06-11 VITALS
OXYGEN SATURATION: 98 % | HEART RATE: 86 BPM | TEMPERATURE: 97.9 F | DIASTOLIC BLOOD PRESSURE: 77 MMHG | SYSTOLIC BLOOD PRESSURE: 128 MMHG

## 2020-06-11 DIAGNOSIS — F19.20 CHEMICAL DEPENDENCY (H): ICD-10-CM

## 2020-06-11 LAB
SARS-COV-2 RNA SPEC QL NAA+PROBE: NOT DETECTED
SPECIMEN SOURCE: NORMAL

## 2020-06-11 PROCEDURE — H2035 A/D TX PROGRAM, PER HOUR: HCPCS | Mod: HQ

## 2020-06-11 PROCEDURE — 10020000 ZZH LODGING PLUS FACILITY CHARGE ADULT

## 2020-06-11 PROCEDURE — U0003 INFECTIOUS AGENT DETECTION BY NUCLEIC ACID (DNA OR RNA); SEVERE ACUTE RESPIRATORY SYNDROME CORONAVIRUS 2 (SARS-COV-2) (CORONAVIRUS DISEASE [COVID-19]), AMPLIFIED PROBE TECHNIQUE, MAKING USE OF HIGH THROUGHPUT TECHNOLOGIES AS DESCRIBED BY CMS-2020-01-R: HCPCS | Performed by: FAMILY MEDICINE

## 2020-06-11 RX ORDER — ACETAMINOPHEN 325 MG/1
650 TABLET ORAL EVERY 4 HOURS PRN
COMMUNITY

## 2020-06-11 RX ORDER — LANOLIN ALCOHOL/MO/W.PET/CERES
3 CREAM (GRAM) TOPICAL
COMMUNITY

## 2020-06-11 RX ORDER — LORATADINE 10 MG/1
10 TABLET ORAL DAILY PRN
COMMUNITY

## 2020-06-11 RX ORDER — IBUPROFEN 200 MG
400 TABLET ORAL EVERY 6 HOURS PRN
COMMUNITY

## 2020-06-11 RX ORDER — MAGNESIUM HYDROXIDE/ALUMINUM HYDROXICE/SIMETHICONE 120; 1200; 1200 MG/30ML; MG/30ML; MG/30ML
30 SUSPENSION ORAL EVERY 6 HOURS PRN
COMMUNITY

## 2020-06-11 RX ORDER — AMOXICILLIN 250 MG
2 CAPSULE ORAL DAILY PRN
COMMUNITY

## 2020-06-11 ASSESSMENT — ANXIETY QUESTIONNAIRES
3. WORRYING TOO MUCH ABOUT DIFFERENT THINGS: NEARLY EVERY DAY
7. FEELING AFRAID AS IF SOMETHING AWFUL MIGHT HAPPEN: SEVERAL DAYS
2. NOT BEING ABLE TO STOP OR CONTROL WORRYING: SEVERAL DAYS
6. BECOMING EASILY ANNOYED OR IRRITABLE: NEARLY EVERY DAY
GAD7 TOTAL SCORE: 14
1. FEELING NERVOUS, ANXIOUS, OR ON EDGE: SEVERAL DAYS
IF YOU CHECKED OFF ANY PROBLEMS ON THIS QUESTIONNAIRE, HOW DIFFICULT HAVE THESE PROBLEMS MADE IT FOR YOU TO DO YOUR WORK, TAKE CARE OF THINGS AT HOME, OR GET ALONG WITH OTHER PEOPLE: SOMEWHAT DIFFICULT
5. BEING SO RESTLESS THAT IT IS HARD TO SIT STILL: MORE THAN HALF THE DAYS

## 2020-06-11 ASSESSMENT — PATIENT HEALTH QUESTIONNAIRE - PHQ9
SUM OF ALL RESPONSES TO PHQ QUESTIONS 1-9: 11
5. POOR APPETITE OR OVEREATING: NEARLY EVERY DAY

## 2020-06-11 NOTE — PROGRESS NOTES
"[facility] Nursing Health Assessment      Vital signs:     There were no vitals taken for this visit.      Direct admission    Counselor: Berna  Drug of Choice: heroin  Last use: 5/4/20  Home clinic/MD: none  Psychiatrist/therapist: Dr [facility]    Medical history/current conditions: none    H&P Screen:  H&P within the last 90 days: Yes.  Date: 5/27 Location: Dr Rodriguez [facility]      Mental Health diagnosis: depression and anxiety PTSD  Medication compliant?: yes  Recent sucidal thoughts? no    When? na  Current thought of self-harm? no    Plan? na    Pain assessment:   Pt. Experiencing pain at this time?  No      Nursing Assessment Summary:  COVID-19 SCREEN COMPLETED BY [facility] RN:     In the last 2 weeks how frequently have you been out in the community?  gas station for cigs    Have you been covering your nose and mouth while out in the community? yes    Who do you live with and have they been following \"stay at home\" orders? Grandparents, and yes    Have you come in contact with anyone in the last month who \"was suspected of\" or \"tested positive\" for COVID-19? no    \"Do you\" or \"have you\" had....any of the following symptoms in the last 2 weeks? no      Fever     Cough     Shortness of breath or difficulty breathing     Chills     Repeated shaking with chills    Muscle pain     Headache     Sore throat     New loss of taste or smell    Does the above COVID screen need to be reviewed by Infection Prevention? no    COVID TEST COMPLETED BY [facility] ? Yes, results pending    COVID-19 - Pt informed of the following while at LP:    Wear mask over nose/mouth at all times when out in pt milieu    Staff will take temperature and O2Sats twice daily    Practice good hand washing hygiene and avoid touching face    If pt has any of the symptoms below, notify staff immediately.      Fever     Cough     Shortness of breath or difficulty breathing     Chills     Repeated shaking with chills    Muscle pain     Integrative " Therapies: Essential Oils    Patient requesting essential oil inhaler to manage (Mood/Mental Health/Physical/Spiritual symptoms).     Discussed appropriate use of essential oil inhalers and instructed patient not to leave labeled product out on unit.     Patient was screened for kidney disease, asthma/reactive airway disease and rashes and wounds or 1st trimester of pregnancy    List Essential Oils requested by pt none at this time    Patient verbalized and demonstrated understanding of how to use essential oil inhaler correctly and will notify [facility] RN with any concerns or side effects. Patient agrees not to share their essential oil inhaler with other clients.  Continue to support the patient in safely utilizing integrative therapies as able to manage symptoms during treatment.       Patient tobacco use:    Do you use tobacco? 3/4 pack per day     Are you interested in quitting? yes  NRT (Nicotine Replacement therapy) ordered? Pt declined  Pt is aware of the dangers of tobacco cessation and in contemplation.    Pt given written education.      On-going nursing intervention required?   No    Acute care visit recommended: no

## 2020-06-11 NOTE — PROGRESS NOTES
Initial Services Plan        Service Initiation Date: 6/11/2020    Immediate health and/or safety concerns: No    Identify health and safety concern(s) below and include plan to address:    None Identified    Treatment suggestions for client during the time between intake (admit date) and completion of the individual treatment plan:     Look for a sober support network, i.e. 12 step, [facility], [facility], etc  Tour the treatment center or outpatient clinic  Introduce yourself to your treatment group. Spend time getting to know your peers  Review your patient or client handbook  Begin working on your treatment goal list    Completed by: EL Virk  Date completed: 6/11/2020 at 10:08 AM

## 2020-06-11 NOTE — PROGRESS NOTES
Progress Note    This patient had a Rule 25 Assessment on 5/27/20 completed by EL Ruiz.  This patient was seen for a face to face update of the Comprehensive Substance Abuse assessment on 6/11/2020 by EL Virk.  INSIDE: The patient's Rule 25 Assessment completed on 5/27/20 is in the patient's electronic medical record in Epic in the Chart Review section under the Notes/Trans Tab.    Alcohol/Drug use since the last CD evaluation (include date of last use):     Marijuana 1 oz. 6/9/20     Please note any other clinical changes since the last CD evaluation (such as medication changes, additional legal charges, detoxification admissions, overdoses, etc.)     No significant changes since the last CD evaluation       ASAM Dimensions Original scores Current Scores   I.) Intoxication and Withdrawal: 0 0   II.) Biomedical:  0 0   III.) Emotional and Behavioral:  2 2   IV.) Readiness to Change:  1 1   V.) Relapse Potential: 4 4   VI.) Recovery Environmental: 2 2     Please list clinical justifications for the above ASAM score changes since the original comprehensive assessment:     None of the ASAM scores on the six dimensions had changed since the Rule 25 Assessment was completed on 5/27/20.       Current TALAT: Current UA:     Not administered. Equipment was unavailable     Positive for Buprenorphine and THC and negative for all other screened drugs.       PHQ-9, YORDY-7   PHQ-9 on 6/11/2020 YORDY-7 on 6/11/2020   The patient's PHQ-9 score was 11 out of 27, indicating moderate depression.   The patient's YORDY-7 score was 14 out of 21, indicating moderate anxiety.       [place]-Suicide Severity Rating Scale Reassessment   Have you ever wished you were dead or that you could go to sleep and not wake up?  Past Month:  No     Have you actually had any thoughts of killing yourself?  Past Month:  No     Have you been thinking about how you might do this?     Past Month:  No   Lifetime:  No   Have you had these  "thoughts and had some intention of acting on them?     Past Month:  No   Lifetime:  No   Have you started to work out the details of how to kill yourself?   Past Month:  No   Lifetime:  No   Do you intend to carry out this plan?   No     When you have the thoughts how long do they last?  The patient denied ever having any suicidal thoughts in life.     Are there things - anyone or anything (i.e. family, [employer], pain of death) that stopped you from wanting to die or acting on thoughts of suicide?  Does not apply       2008  [facility]  Used with permission by Kelli Muniz, PhD.       Guide to C-SSRS Risk Ratings   NO IDEATION:  with no active thoughts IDEATION: with a wish to die. IDEATION: with active thoughts. Risk Ratings   If Yes No No 0 - Very Low Risk   If NA Yes No 1 - Low Risk   If NA Yes Yes 2 - Low/moderate risk   IDEATION: associated thoughts of methods without intent or plan INTENT: Intent to follow through on suicide PLAN: Plan to follow through on suicide Risk Ratings cont...   If Yes No No 3 - Moderate Risk   If Yes Yes No 4 - High Risk   If Yes Yes Yes 5 - High Risk   The patient's ADDITIONAL RISK FACTORS and lack of PROTECTIVE FACTORS may increase their overall suicide risk ratings.     Additional Risk Factors:    No additional risk factors   Protective Factors:    Having people in his/her life that would prevent the patient from considering a suicide attempt (i.e. young children, spouse, parents, etc.)     An absence of mental health issues or stable and well treated mental health issues     An absence of chronic health problems or stable and well treated chronic health issues     Having easy access to supportive family members     Having restricted access to highly lethal means of suicide     Risk Status   1. - Low Risk: Evaluation Counselors:  Document in Epic / SBAR to counselor \"Low Risk\".      Treatment Counselors:  Reassess upon admission as applicable, " assess weekly in progress notes under Dimension 3 and summarize in Discharge / Treatment summary under Dimension 3.     Additional information to support suicide risk rating: There was no additional information to provide at this time.
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
## 2020-06-11 NOTE — PROGRESS NOTES
Comprehensive Assessment Summary     Based on client interview, review of previous assessments and   comprehensive assessment interview the following diagnosis and recommendations are:     Patient: Dami Weber  MRN; [id number]   : 1995  Age: 25 year old Sex: male       Client meets criteria for:  304.30 Cannabis Dependence  304.00 Opioids Dependence  305.10 Nicotine Dependence    Dimension One: Acute Intoxication/Withdrawal Potential     Ratin  (Consider the client's ability to cope with withdrawal symptoms and current state of intoxication) Patient reports that his last use of heroin was 20, last use of cannabis was 20. Patient denies any current withdrawal issues at this time.     Dimension Two: Biomedical Condition and Complications    Ratin  (Consider the degree to which any physical disorder would interfere with treatment for substance abuse, and the client's ability to tolerate any related discomfort; determine the impact of continued chemical use on the unborn child if the client is pregnant)  Patient denies any current biomedical conditions that would interfere with treatment.     Dimension Three: Emotional/Behavioral/Cognitive Conditions & Complications  Ratin  (Determine the degree to which any condition or complications are likely to interfere with treatment for substance abuse or with functioning in significant life areas and the likelihood of risk of harm to self or others)   Patient reports a history of diagnosis of bipolar and substance use disorder. Rule out ADHD. Patient also reported past trauma and grief from the loss of his mother. Patient reported benefiting from [facility] therapy in the past but has no current therapist.     Dimension Four: Treatment Acceptance/Resistance     Ratin  (Consider the amount of support and encouragement necessary to keep the client involved in treatment)   Patient appears motivated with active reinforcement to explore recovery at  this time.     Dimension Five: Continued Use/Relaspe Prevention     Ratin  (Consider the degree to which the client's recognizes relapse issues and has the skills to prevent relapse of either substance use or mental health problems)   Patient reports one previous treatment in . Patient has struggled with relapse. He has had 1.5 yrs sober time in the past. Pt currently lacks insight into his relapse triggers and healthy sober coping skills.     Dimension Six: Recovery Environment     Rating:   3  (Consider the degree to which key areas of the client's life are supportive of or antagonistic to treatment participation and recovery)   Patient reported that he is currently living with his grandparents. He is currently unemployed. He has a 5 yr old son and 3 yr old daughter who live with the children's mother. Patient denied any current legal issues.     I have reviewed the information on the assessment, psychosocial and medical history and checklist:        the following changes have been made: DIM VI: 2,3

## 2020-06-12 ENCOUNTER — HOSPITAL ENCOUNTER (OUTPATIENT)
Dept: BEHAVIORAL HEALTH | Facility: CLINIC | Age: 25
End: 2020-06-12
Attending: FAMILY MEDICINE
Payer: MEDICAID

## 2020-06-12 VITALS — OXYGEN SATURATION: 96 % | TEMPERATURE: 96.5 F

## 2020-06-12 PROCEDURE — H2035 A/D TX PROGRAM, PER HOUR: HCPCS | Mod: HQ

## 2020-06-12 PROCEDURE — 10020000 ZZH LODGING PLUS FACILITY CHARGE ADULT

## 2020-06-12 ASSESSMENT — ANXIETY QUESTIONNAIRES: GAD7 TOTAL SCORE: 14

## 2020-06-12 NOTE — PROGRESS NOTES
Physical Therapy Daily Treatment/[facility] summary     Visit Count: 2  Plan of Care Dates: Initial: 11/9/2017 Through: 12/21/2017  Â   Insurance Information:   [id number] [facility]  Co-pay=  none  Visit limit= none  Auth req? = none  500 ded  1500 oop  90% coinsurance  11/9 brit  Next Referring Provider Visit: Hip surgeon next week Wed   Â   Referred by: Parth Freire NP  Medical Diagnosis (from order): M25.552 Left hip pain   Treatment Diagnosis: Hip Symptoms with Pain, Impaired Joint Mobility, Impaired Range of Motion, Impaired Motor Function/Muscle Performance, Impaired Gait/Locomotion Deficits and Impaired Mobility  Insurance: 1. [employer]  2. N/A  Â   Date of onset/injury: chronic   Â   Diagnosis Precautions: none  Chart reviewed: Relevant co-morbidities, allergies, tests and medications: hx of RFA with back pain improvement. Â   MRI:  A small minimal thin amount of contrast extends between the superior left  acetabular articular cartilage and the labrum. As this is quite thin and  does not extend into the labrum and this may be due to a small sublabral  recess. This could also be due to a small nondisplaced tear. Â   Mild bilateral trochanteric bursitis. Â   Mild endplate degenerative changes seen in the partially visualized lumbar  spine. The lumbar spine is not formally evaluated. Â   The uterus is visualized but not formally evaluated. 2 hypodense masses are  seen in the uterus, largest measuring 1.2 cm, consistent with fibroids. There is also the suggestion of thickening of the junctional zone,  suggesting adenomyosis. Further evaluation with pelvic ultrasound and  possibly MRI may be helpful. Â   Â   X-ray: Mild bilateral hip OA (Left greater than  Right)     SUBJECTIVE   Patient reports she is feeling achy and sore today. Current Pain: 5-6/10.  Â     Functional Change: None     OBJECTIVE   SI assessment   Right leg length longer   Left sacral sulcus deeper     Treatment   Therapeutic Exercise: Pt completed and submitted their safety plan and tx plan goals. Please see scan in media section.    NuStep level 5 x 5 min   Standing hamstring stretch 2 x 30 seconds bilateral   Bridges with dorsiflexion assist x 10   Side lying clam shells with 5 second hold x 10 bilateral     Neuromuscular Reeducation:  TrA Bracing in hook lying position with blood pressure biofeedback 40 mmHg-60mmHg 5 second hold x 10  TrA bracing in hook lying position with blood pressure biofeed back 40 mmHg-60mmHg with alternating knee fall outs X20 total  TrA bracing in hook lying position with blood pressure biofeed back 40 mmHg-60mmHg with alternating hip marches X20 total     Manual Therapy:   MET right hip extensors lift hip flexors   Shotgun technique   PA mob to R sacral sulcus   Lateral and inferior hip mob with mulligan belt grade II L    Current Home Program (not performed this date except as noted above):   Bridges x 10     ASSESSMENT   Patient positive for SIJ dysfunction which was able to be corrected with a MET. Patient challenged with performing TrA braces due to weakness. Patient require multiple cues to perform TrA braces correctly. Patient did fatigue quickly with glute strengthening. Continued skilled therapy is required to increase core stability to decrease SIJ dysfunction. Pain after treatment: 0/10  Result of above outlined education: Verbalizes understanding and Demonstrates understanding    Goals: To be obtained by end of this plan of care:  1. Patient independent with modified and progressed home exercise program.  2. Patient will decrease involved hip pain to 2/10  to aid in community ambulation for activities of independent living, normalization of gait for independent living and ambulating on level and unlevel surfaces. 3. Patient will increase involved hip passive range of motion to 10Â° hip extensionto aid in normalization of gait for independent living. 4. Patient will increase involved hip strength to 5/5 to aid in squatting and lifting mechanics.    5. Lower Extremity Functional Scale: Patient will complete form to reflect an improved score from initial score of 39 to greater than or equal to 55 (0=extreme difficulty; 80=no difficulty) to indicate pt reported improvement in function/disability/impairment (minimal detectable change: 9 points). PLAN   SI assessment and correct as needed, core and glute stability     THERAPY DAILY BILLING   Primary Insurance:  [employer]  Secondary Insurance: N/A    Evaluation Procedures:  No evaluation codes were used on this date of service    Timed Procedures:  Manual Therapy, 10 minutes  Neuromuscular Re-Education, 15 minutes  Therapeutic Exercise, 15 minutes    Untimed Procedures:  No untimed codes were used on this date of service    Total Treatment Time: 40 minutes    The referring provider's electronic or written signature on the evaluation authorizes the therapy plan of care. Physician Signature on file. Discharge Summary Addendum:  Date: 11/27/2017  Total Number of Visits: 2  Surgery Date: None    Patient phoned and reported she will call back if continued therapy is needed.  .  Status of goals: per status above    Treatment Category: Hip, Non-Surgical  Outcome Measure: Lower Extremity Functional Scale    Initial Outcome Score:  39   Discharge Score Error: Patient Error  Discharge Outcome Score: Not Applicable  Primary Clinician: Sp Momin

## 2020-06-12 NOTE — PROGRESS NOTES
Pt approached writer with concerns about not being allowed to smoke and not being able to see his family while at [facility] Writer contacted peer recovery  to meet with pt.

## 2020-06-12 NOTE — PROGRESS NOTES
First Group Note:    D. Pt attended their first group session on this date. They were briefed on group guidelines and gave a brief introduction of themself.   I. Counselor and group peers welcomed patient to the group and reminded them of the schedule and that a counselor will be following up with them 1:1 to begin tx planning.   A. Pt appears appropriate for group at this time.   P. Pt will continue to follow the CD programming and guidelines.

## 2020-06-12 NOTE — PROGRESS NOTES
Patient: Dami Weber     Date: 6/12/20     Comprehensive Assessment UPDATE         Comprehensive Summary Update and Review  Counselor met with patient on 6/12/20 and reviewed the Comprehensive Assessment.    There were no changes/updates identified by patient or in chart entries.

## 2020-06-12 NOTE — PROGRESS NOTES
[facility]  Adult Chemical Dependency Program  Treatment Plan Requirements    These services are provided by the facility for each patient/client according to the individual's treatment plan:    Individual and group counseling    Education    Transition services    Services to address any co-occurring mental illness    Service coordination    Initial Treatment Plan Goals:  1. Complete all the requirements of Program Orientation.  2. Maintain medication compliance throughout the program.  3. Complete requirements for workshop/skills groups based on identified issues on your problem list.  4. Complete the support group attendance feedback sheet weekly.  5. Gain family involvement in treatment process to address family issues from the problem list.  6. Attend and participate in all required groups per individual treatment plan.  7. Focus attention to individualized issues from the treatment plan.  8. Complete all requirements for UA's, alcohol screening tests and other testing.  9. Schedule a physical examination if recommended.    In addition to the above, complete all individual goals as specifically outlines on your treatment plan.    Criteria for discharge:  Patients/clients are discharged from the program following completion of the entire program including Phase I and II or acceptance of other post-treatment referrals such as [facility] house, or aftercare at other facilities.  Patients/clients may also be discharged for inappropriate behavior or chemical use.      Favorable Discharge - Patients/clients have completed agreed upon treatment goals, understand their diagnosis and appear motivated about the follow-up care.    Guarded Discharge - Patients/clients have demonstrated some understanding of their diagnosis and recovery process, and have completed some of their treatment goals.  This prognosis also includes patients/clients who have completed some treatment goals but have not made  commitment to community support or follow through with referrals.    Unfavorable Discharge - Patients/clients have not completed agreed upon treatment goals due to their own choice, have limited understanding of their diagnosis, and have shown minimal or inconsistent behavior conducive to recovery.  Those patients/clients discharged due to behavioral problems will also be unfavorable discharges.                                  Adult CD Treatment Plan     Dami Weber [id number]   1995 25 year old male        ------------------------------------------------------------------------------------------------------------------  Acute Intoxication/Withdrawal Potential     DIMENSION 1  RISK FACTOR: 0             AssignmentDate Source Prob/Goal/  Intervention Target  Date Initials Outcome Completion  Date   6/12/20 Self -  Current, History -  Current and Assessment -  Current  Problem: Substance use, cravings and urges.  Last use date of heroin was 5/4/20, last use of cannabis was 5/25/20.     Goal: Be able to manage mild to moderate withdrawal symptoms.  Intervention: Report to nurse any increase in withdrawal symptoms. 6/11-7/9 DL            Biomedical Conditions and Complaints     DIMENSION 2  RISK FACTOR: 0             AssignmentDate Source Prob/Goal/  Intervention Target  Date Initials Outcome Completion  Date   6/12/20 Self -  Current, History -  Current and Assessment -  Current  Problem: None  Goal: None  Intervention: Continue to take prescribed medications and follow-up with medical interventions while in program. 6/11-7/9 DL          -----------------------------------------------------------------------------------------------------------------      Emotional/Behavioral/Cognitive Conditions and Complications      DIMENSION 3   RISK FACTOR: 2      AssignmentDate  Source  Prob/Goal/   Intervention  Target   Date  Initials  Outcome  Completion   Date    6/12/20 Self - Current, History - Current and  "Assessment - Current  Problem:  Patient reports a history of diagnosis of bipolar and substance use disorder.  Patient also reported past trauma and grief from the loss of his mother.   Goal:   Understand the relationship between addiction and mental health issues.   Intervention:   A. Complete and present, \" \"Mental Health versus Addiction\", also   B. Complete and present, \" \"A Letter to Mom\" in group.   C. Meet with therapist for one on one sessions while in [facility].  A. 6/17     B.  6/23    C. TBD DL          6/12/20 Self - Current, History - Current and Assessment - Current  Problem: Patient participated in a suicide risk screening and was rated as being at a low risk for suicide.   Goal: Patient will remain safe in treatment.        1. Patient will be aware of warning signs for self-harm and report any new symptoms or increase in depression/anxiety ASAP.    2. Pt will be monitored and reassessed if change in risk rating is indicated.     3. Complete a safety plan ASAP Ongoing thru 7/9  DL       -------------------------------------------------------------------------------------------------------------------   Readiness to Change      DIMENSION 4   RISK FACTOR: 1      AssignmentDate  Source  Prob/Goal/   Intervention  Target   Date  Initials  Outcome  Completion   Date    6/12/20 Self - Current, History - Current and Assessment - Current  Problem: Patient has has had their life negatively impacted by substance use. Their use has also negatively impacted others.   Goal: Increase internal motivation for positive change.   Intervention:   A. Complete and present, \"1st Step Assignment\".  B. Complete and present: \"Drug History\".  C. Write a letter to your children expressing your feelings toward each of them (x3) and why you want to be there for them (sober).   A. 6/15      B. 6/19    C. 6/25 DL         6/12/20 Self - Current, History - Current and Assessment - Current  Problem: Patient's substance use has " "limited their life.   Goal: Begin to get clear on what you want for yourself in your recovery.    Intervention: Complete and present,  \"Setting and Pursuing Goals in Recovery\".   6/29 DL         -------------------------------------------------------------------------------------------------------------------   Relapse/Continues Use/Continues Problem Potential      DIMENSION 5   RISK FACTOR: 4      AssignmentDate  Source  Prob/Goal/   Intervention  Target   Date  Initials  Outcome  Completion   Date    6/12/20 Self - Current, History - Current and Assessment - Current  Problem: Patient has  struggled with relapse.    Goal: Identify and understand personal relapse and self-sabotage patterns.   Intervention:  A. Complete and present, \"Relapse Prevention Planning\".   B. Complete and present, \"5 yrs sober/5 years using\" . A.  7/1      B.  7/3  DL         Recovery Environment      DIMENSION 6   RISK FACTOR: 3     AssignmentDate  Source  Prob/Goal/   Intervention  Target   Date  Initials  Outcome  Completion   Date    6/12/20 Self - Current, History - Current and Assessment - Current  Problem: Patient lacks  a sober support network.   Goal: Begin to develop and implement a sober support network.   Intervention:   A. Attend a minimum of three 12 Step meetings per week starting while in [facility].   B. Complete the handout, \"Building My Support Network\" and hand into counselors.   C. Fill out and turn in the \"Temporary Sponsor\" form.  A.  6/11-  7/9      B. 7/6    C. 6/16 DL          6/12/20 Self - Current, History - Current and Assessment - Current  Problem: Patient needs an aftercare plan.    Goal: Determine next steps after discharge from [facility] program.  Intervention: Explore outpatient and/or sober living placement.   TBD DL       Individual abuse prevention plan (required for [facility]) : specific actions, referral:   No additional protection measures required other than the Program Abuse Prevention Plan - " No     Acknowledgement of Current Treatment Plan - Initial Treatment Plan     INITIAL TREATMENT PLAN:     1. I have participated in creating my treatment plan with my therapist / counselor on ...     I agree with the plan as it is written in the electronic health record.    Name Signature/Date        Name of Therapist / Counselor Signature/Date    EL Sanders      2. I have completed and reviewed my Safety Plan with my counselor and signed this on _______ I have been given the hard copy of this plan.    Patient signature/date:      ________________________________________________________________    3. Last Use Date:      Patient signature/date:     ________________________________________________________________

## 2020-06-13 ENCOUNTER — HOSPITAL ENCOUNTER (OUTPATIENT)
Dept: BEHAVIORAL HEALTH | Facility: CLINIC | Age: 25
End: 2020-06-13
Attending: FAMILY MEDICINE
Payer: MEDICAID

## 2020-06-13 VITALS — TEMPERATURE: 97.2 F | OXYGEN SATURATION: 99 %

## 2020-06-13 PROCEDURE — H2035 A/D TX PROGRAM, PER HOUR: HCPCS | Mod: HQ

## 2020-06-14 NOTE — PROGRESS NOTES
Around 1535hr, writer saw this pt using the phone in the hallway behind the [employer]. Writer directed him to go back to the unit and explained him that [facility] pts are not allowed to use any other hospital phones besides the ones on the 5th and 6th floor. Pt verbalized understanding.

## 2020-06-14 NOTE — PROGRESS NOTES
Name: Dami Weber  Date: 6/13/2020  Medical Record: [id number]    Envelope Number: [id number]    List of Contents (List each item separately in new row):   Gabapentin 300mg Caps    Admission:  I am responsible for any personal items that are not sent to the safe or pharmacy.  [facility] is not responsible for loss, theft or damage of any property in my possession.      Patient Signature:  ___________________________________________       Date/Time:__________________________    Staff Signature: __________________________________       Date/Time:__________________________    2nd Staff person, if patient is unable/unwilling to sign:      __________________________________________________________       Date/Time: __________________________      Discharge:  [facility] has returned all of my personal belongings:    Patient Signature: ________________________________________     Date/Time: ____________________________________    Staff Signature: ______________________________________     Date/Time:_____________________________________

## 2020-06-14 NOTE — PROGRESS NOTES
Around 2010h, 5th floor staff got reported by a female pt that something suspicious behavior going on in the Wii room. When the 5th floor staff checked in the room, the door was half-closed and two male pts were found watching the door from inside and this pt was found standing right in front of a female peer who was sitting on a chair behind the door. As soon staff got inside the room, they all dispersed themselves. Later, staff was reported that this pt had made up his mind and wanted to leave the program with the female peer. Around 2130h, this pt left the unit with the female peer without even taking his meds. 5th floor staff escorted them down the [facility] and they left together in a cab.

## 2020-06-14 NOTE — PROGRESS NOTES
Name: Dami Weber  Date: 6/13/2020  Medical Record: [id number]    Envelope Number: [id number]    List of Contents (List each item separately in new row):   Suboxone 8-2mg films = 29 & 1/2 films    Admission:  I am responsible for any personal items that are not sent to the safe or pharmacy.  [facility] is not responsible for loss, theft or damage of any property in my possession.      Patient Signature:  ___________________________________________       Date/Time:__________________________    Staff Signature: __________________________________       Date/Time:__________________________    2nd Staff person, if patient is unable/unwilling to sign:      __________________________________________________________       Date/Time: __________________________      Discharge:  [facility] has returned all of my personal belongings:    Patient Signature: ________________________________________     Date/Time: ____________________________________    Staff Signature: ______________________________________     Date/Time:_____________________________________

## 2020-06-15 NOTE — PROGRESS NOTES
CHEMICAL DEPENDENCY DISCHARGE SUMMARY    PATIENT NAME:  Dami Weber  :  1995    EVALUATION COUNSELOR: EL Ruiz  TREATMENT COUNSELORS: EL Arizmendi,  CHIQUIS Sanders  REFERRAL SOURCE:  Self  PROGRAM:  [facility]  ADMISSION DATE: 20  DATE OF LAST SESSION: 20  DISCHARGE DATE: 20  ADMISSION DIAGNOSIS:    F12.20 Cannabis Dependence, Severe  F11.20 Opioid Use Disorder, Severe   F17.200 Tobacco Use Disorder, Moderate    DISCHARGE DIAGNOSIS:  F12.20 Cannabis Dependence, Severe  F11.20 Opioid Use Disorder, Severe   F17.200 Tobacco Use Disorder, Moderate    DISCHARGE STATUS: Patient left against staff advive  LAST USE DATE: Patient reported last date of use of heroin was 20, last use of cannabis was 20.   DAYS OF TREATMENT COMPLETED:  Patient completed 3 days of treatment    PRESENTING INFORMATION:      SERVICES PROVIDED:  Due to patient's short treatment due to leaving against staff advice, a treatment plan wasn't able to be formulated/ presented to him. Patient attended 5 group sessions.     ISSUES ADDRESS IN TREATMENT:    DIMENSION 1 - ACUTE INTOXICATION/WITHDRAWAL POTENTIAL  ADMISSION RISK RATIN  DISCHARGE RISK RATIN  Patient entered into [facility] on 20. Throughout treatment patient denied any withdrawal symptoms that would interfere with full participation in treatment programming.     DIMENSION 2 - BIOMEDICAL COMPLICATIONS AND CONDITIONS  ADMISSION RISK RATIN  DISCHARGE RISK RATIN  Upon admissions patient denied any medical concerns that would interfere with full participation in treatment programming.      DIMENSION 3 - EMOTIONAL, BEHAVIORAL, COGNITIVE CONDITIONS AND COMPLICATIONS  ADMISSION RISK RATIN  DISCHARGE RISK RATIN  Upon admissions patient reported a mental health diagnosis of bipolar and substance use disorder. Rule out ADHD. Patient also reported past trauma and grief  "from the loss of his mother. Upon admissions patient was given a suicidal risk screening. Patient was rated as \"low risk\". Upon discharge patient denied any suicidal thoughts or ideations.      DIMENSION 4 - READINESS FOR CHANGE  ADMISSION RISK RATIN  DISCHARGE RISK RATIN  Patient initially appeared open to consider treatment services in a residential setting.  Upon admission, patient appeared to be reconsidering his decision, \"I'm not sure I'm going to stay.\" Patient left against staff advice.     DIMENSION 5 - RELAPSE, CONTINUED USE AND CONTINUED PROBLEM POTENTIAL   ADMISSION RISK RATIN  DISCHARGE RISK RATIN  Patient has struggled with relapse. He has had 1.5 yrs sober time in the past. Pt currently lacks insight into his relapse triggers and healthy sober coping skills.     DIMENSION 6 - RECOVERY ENVIRONMENT  ADMISSION RISK RATING: 3  DISCHARGE RISK RATIN  Patient lacks a healthy sober support network. Patient decided to leave [facility] with a female patient. Patient reports that he has been living with his grandparents. Patient reports having 3 children who live with their mothers.       LIVING ARRANGEMENTS AT DISCHARGE: Home    PROGNOSIS:  Prognosis for this patient is unfavorable at this time.  Patient left treatment against staff advice. Patient left treatment with a female treatment peer. Patient would benefit from attending a male only treatment in the future.       CONTINUING CARE RECOMMENDATIONS AND REFERRALS:    1.  Abstain from all mood-altering chemicals unless prescribed by a licensed medical provider, and take all medications as prescribed.  2.  Attend a minimum of three AA/NA/Sober support groups weekly in the community/online.  3.  Begin working with a sponsor and maintain regular contact with them.  4.  Admit immediately into a male only CD treatment and follow all recommendations.  5.  Continue to invest in building a sober support network.  6.  Continue to " monitor and understand relapse triggers and stressors and implement, and continue to development, healthy coping skills.  7.  Obtain a mental health therapist and attend all scheduled programming  8. Attend all scheduled medical appointments.  9. Take medication as prescribed       This information has been disclosed to you from records protected by Federal confidentiality rules (42 CFR part 2). The Federal rules prohibit you from making any further disclosure of this information unless further disclosure is expressly permitted by the written consent of the person to whom it pertains or as otherwise permitted by 42 CFR part 2. A general authorization for the release of medical or other information is NOT sufficient for this purpose. The Federal rules restrict any use of the information to criminally investigate or prosecute any alcohol or drug abuse patient.       EL Sanders

## 2020-06-25 ENCOUNTER — VIRTUAL VISIT (OUTPATIENT)
Dept: ADDICTION MEDICINE | Facility: CLINIC | Age: 25
End: 2020-06-25
Payer: MEDICAID

## 2020-06-25 DIAGNOSIS — F11.20 OPIOID USE DISORDER, SEVERE, DEPENDENCE (H): ICD-10-CM

## 2020-06-25 DIAGNOSIS — F11.20 OPIOID USE DISORDER, SEVERE, ON MAINTENANCE THERAPY (H): ICD-10-CM

## 2020-06-25 PROCEDURE — 99442 ZZC PHYSICIAN TELEPHONE EVALUATION 11-20 MIN: CPT | Performed by: FAMILY MEDICINE

## 2020-06-25 RX ORDER — BUPRENORPHINE HYDROCHLORIDE, NALOXONE HYDROCHLORIDE 8; 2 MG/1; MG/1
FILM, SOLUBLE BUCCAL; SUBLINGUAL
Qty: 35 FILM | Refills: 0 | Status: SHIPPED | OUTPATIENT
Start: 2020-06-25

## 2020-06-25 RX ORDER — GABAPENTIN 300 MG/1
300 CAPSULE ORAL 3 TIMES DAILY PRN
Qty: 90 CAPSULE | Refills: 1 | Status: SHIPPED | OUTPATIENT
Start: 2020-06-25

## 2020-06-25 NOTE — PATIENT INSTRUCTIONS
OK to take suboxone 8mg this evening. Reports he will have his full Rx from his grandmother starting tomorrow.    Please fax NIRMALA to our clinic, [phone]

## 2020-06-25 NOTE — PROGRESS NOTES
"Dami Weber is a 25 year old male who is being evaluated via a billable telephone visit.      The patient has been notified of following:     \"This telephone visit will be conducted via a call between you and your physician/provider. We have found that certain health care needs can be provided without the need for a physical exam.  This service lets us provide the care you need with a short phone conversation.  If a prescription is necessary we can send it directly to your pharmacy.  If lab work is needed we can place an order for that and you can then stop by our lab to have the test done at a later time.    Telephone visits are billed at different rates depending on your insurance coverage. During this emergency period, for some insurers they may be billed the same as an in-person visit.  Please reach out to your insurance provider with any questions.    If during the course of the call the physician/provider feels a telephone visit is not appropriate, you will not be charged for this service.\"    Patient has given verbal consent for Telephone visit?  Yes    What phone number would you like to be contacted at? [phone]    How would you like to obtain your AVS? Mail a copy          SUBJECTIVE:                                                    BUPRENORPHINE FOLLOW UP:    Dami Wbeer is a 25 year old male who completes phone visit today for follow up of Buprenorphine management        Date of last visit:  6/10/2020    Primary Care Provider: Physician No Ref-Primary        HPI:    6/25/2020  - SE:   - Has re-entered treatment at [facility] - confirmed with staff over the phone during our visit - [phone]. Provided verbal consent for us to send AVS via fax with medication info  - [facility] he used meth, but did not use heroin  - Suffered suboxone withdrawals, as he did not  his meds from [facility]  - Has restarted suboxone using an old Rx that he found at his grandmother's " house. Yesterday he took 20mg.  - Feeling safe at his new program. Wants to continue with [facility] for 2 months. After that time, he plans to collect an insurance settlement from his mom's death and buy a house.  - Wants to consider weaning suboxone at that time as well, but he is willing to consider a longer course  - Down from 1ppd to 2 cigarettes today. Using e-cigarettes to help him quit. Has been successful in this effort in the past.          Patient Active Problem List    Diagnosis Date Noted     Chemical dependency (H) 06/11/2020     Priority: Medium     Major depressive disorder, recurrent episode, moderate (H) 05/13/2020     Priority: Medium     Opioid use disorder, severe, dependence (H) 04/29/2020     Priority: Medium     Stimulant use disorder 04/29/2020     Priority: Medium     Tobacco use disorder 04/29/2020     Priority: Medium     Cannabis use disorder, moderate, dependence (H) 04/29/2020     Priority: Medium     PTSD (post-traumatic stress disorder) 04/29/2020     Priority: Medium       Problem list and histories reviewed & adjusted, as indicated.  Additional history: as documented      acetaminophen (TYLENOL) 325 MG tablet, Take 650 mg by mouth every 4 hours as needed for mild pain  alum & mag hydroxide-simethicone (MAALOX) 200-200-20 MG/5ML SUSP suspension, Take 30 mLs by mouth every 6 hours as needed for indigestion  gabapentin (NEURONTIN) 300 MG capsule, Take 1 capsule (300 mg) by mouth 3 times daily as needed (anxiety, restless legs)  guaiFENesin (ROBITUSSIN) 20 mg/mL SOLN solution, Take 10 mLs by mouth every 4 hours as needed for cough  ibuprofen (ADVIL/MOTRIN) 200 MG tablet, Take 400 mg by mouth every 6 hours as needed for mild pain  loratadine (CLARITIN) 10 MG tablet, Take 10 mg by mouth daily as needed for allergies  melatonin 3 MG tablet, Take 3 mg by mouth nightly as needed for sleep  naloxone (NARCAN) 4 MG/0.1ML nasal spray, Spray 1 spray (4 mg) into one nostril alternating nostrils  as needed for opioid reversal every 2-3 minutes until assistance arrives  phenol-menthol (CEPASTAT) 14.5 MG lozenge, Place 1 lozenge inside cheek every 2 hours as needed for moderate pain  senna-docusate (SENOKOT-S/PERICOLACE) 8.6-50 MG tablet, Take 2 tablets by mouth daily as needed for constipation  SUBOXONE 8-2 MG per film, Take 1.5 films in AM, 1 film in PM    No current facility-administered medications on file prior to visit.       Allergies   Allergen Reactions     Amoxicillin      Penicillins          OBJECTIVE    GENERAL: healthy, alert and no distress  RESP: no audible wheeze, cough.  No increased work of breathing.  Able to speak fully in complete sentences.  PSYCH: mentation appears normal, affect normal/bright, judgement and insight intact, normal speech        Labs reviewed in EPIC      ASSESSMENT/PLAN:  Dami was seen today for drug problem.    Diagnoses and all orders for this visit:    Opioid use disorder, severe, on maintenance therapy (H)  -     gabapentin (NEURONTIN) 300 MG capsule; Take 1 capsule (300 mg) by mouth 3 times daily as needed (anxiety, restless legs)  -     SUBOXONE 8-2 MG per film; Take 1.5 films in AM, 1 film in PM    Opioid use disorder, severe, dependence (H)  -     naloxone (NARCAN) 4 MG/0.1ML nasal spray; Spray 1 spray (4 mg) into one nostril alternating nostrils as needed for opioid reversal every 2-3 minutes until assistance arrives    Currently at treatment center. Will be wary of providing >1-2 weeks Rx if he does not stay at [facility] - Dami provided verbal consent for communication with staff there. #35 films sent to [place] where his grandmother lives; will discuss further with treatment center regarding appropriate location for future Rx dispensing.             (Z79.899) High risk medication use   Plan: High Risk Drug Monitoring?  YES   Drug being monitored: Buprenorphine   Reason for drug: Opioid use disorder   What is being monitored?: Dosage, Cravings,  Trigger, side effects, and continued abstinence.      Phone call contact time:    18 minutes      [facility] Data Base Reviewed:    Yes ;    Consistent with patient reports and Epic records.    Counseled the patient on the importance of having a recovery program in addition to medication to manage recovery.  Components include avoiding isolating, having willingness to change, avoiding triggers and managing cravings. Encouraged having some type of sober network and practicing honesty with trusted support person(s). Encouraged other services such as counseling, 12 step or other self-help organizations.      Opioid warning reviewed.  Risk of overdose following a period of abstinence due to decrease tolerance was discussed including risk of death.  Strongly recommended abstain from alcohol, benzodiazepines, THC, opioids and other drugs of abuse.  Increased risk of return to opioid use after use of these substances discussed.  Increased risk of overdose/death with use of other substances particularly benzodiazepines/alcohol reviewed.      RTC - 2 weeks    Jameson Rodriguez MD  [facility] - [phone]

## 2020-07-02 ENCOUNTER — OFFICE VISIT (OUTPATIENT)
Dept: ADDICTION MEDICINE | Facility: CLINIC | Age: 25
End: 2020-07-02
Payer: MEDICAID

## 2020-07-02 DIAGNOSIS — F11.20 OPIATE DEPENDENCE (H): Primary | ICD-10-CM

## 2020-07-02 NOTE — PROGRESS NOTES
Met with Dami at [facility]    30mins    Dami has been at [facility] for a couple weeks now after leaving  and being homeless for about a week. He told me that he has been going through some very emotional grieving of past relationships, lifestyle and family. Suman says that using is really the only coping tool that he has and without it, life is very uncomfortable.     He states that he knows that he is where he is supposed to be(at [facility]) and is glad that he is there.     Same time next week.

## 2020-07-09 ENCOUNTER — TELEPHONE (OUTPATIENT)
Dept: ADDICTION MEDICINE | Facility: CLINIC | Age: 25
End: 2020-07-09

## 2020-07-09 NOTE — TELEPHONE ENCOUNTER
Reason for Call:  Other appointment    Detailed comments: Patient had called wanting to know if he is able to do a virtual visit tomorrow instead of in person? According to patient he is actually out of town    Phone Number Patient can be reached at: Home number on file [phone] (home)    Best Time: Anytime    Can we leave a detailed message on this number? YES    Call taken on 7/9/2020 at 3:34 PM by Chiara Hyatt

## 2020-07-10 NOTE — TELEPHONE ENCOUNTER
Patient cancelled appointment due to moving out of town.   Routing to provider as FYI.    Lulú Bacon, RN    Nurse Liaison  [facility] [place]    Addiction Medicine Services

## 2021-05-28 ENCOUNTER — RECORDS - HEALTHEAST (OUTPATIENT)
Dept: ADMINISTRATIVE | Facility: CLINIC | Age: 26
End: 2021-05-28